# Patient Record
Sex: FEMALE | Race: WHITE | NOT HISPANIC OR LATINO | Employment: UNEMPLOYED | URBAN - METROPOLITAN AREA
[De-identification: names, ages, dates, MRNs, and addresses within clinical notes are randomized per-mention and may not be internally consistent; named-entity substitution may affect disease eponyms.]

---

## 2023-01-18 PROBLEM — N63.10 BREAST MASS, RIGHT: Status: ACTIVE | Noted: 2023-01-18

## 2023-01-18 PROBLEM — M53.3 COCCYXDYNIA: Status: ACTIVE | Noted: 2023-01-18

## 2023-01-18 PROBLEM — E07.9 THYROID DISEASE: Status: ACTIVE | Noted: 2023-01-18

## 2023-01-18 PROBLEM — R25.1 TREMOR: Status: ACTIVE | Noted: 2023-01-18

## 2023-01-18 PROBLEM — M47.816 DEGENERATIVE ARTHRITIS OF LUMBAR SPINE: Status: ACTIVE | Noted: 2023-01-18

## 2023-01-18 PROBLEM — N83.209 OVARIAN CYST: Status: ACTIVE | Noted: 2023-01-18

## 2023-01-18 PROBLEM — F41.9 ANXIETY DISORDER: Status: ACTIVE | Noted: 2023-01-18

## 2023-01-18 PROBLEM — M54.50 LOW BACK PAIN: Status: ACTIVE | Noted: 2023-01-18

## 2023-01-18 PROBLEM — S13.9XXA CERVICAL SPRAIN: Status: ACTIVE | Noted: 2023-01-18

## 2023-01-18 PROBLEM — R42 SPINNING SENSATION: Status: ACTIVE | Noted: 2023-01-18

## 2023-01-18 PROBLEM — E86.0 DEHYDRATION: Status: ACTIVE | Noted: 2023-01-18

## 2023-01-18 PROBLEM — N60.09 BREAST CYST: Status: ACTIVE | Noted: 2023-01-18

## 2023-01-18 PROBLEM — M79.2 NERVE PAIN: Status: ACTIVE | Noted: 2023-01-18

## 2023-01-18 PROBLEM — E53.8 B12 DEFICIENCY: Status: ACTIVE | Noted: 2023-01-18

## 2023-01-18 PROBLEM — R10.2 PAIN IN PELVIS: Status: ACTIVE | Noted: 2023-01-18

## 2023-01-18 PROBLEM — M62.838 MUSCLE SPASM: Status: ACTIVE | Noted: 2023-01-18

## 2023-01-18 PROBLEM — E03.9 HYPOTHYROIDISM: Status: ACTIVE | Noted: 2023-01-18

## 2023-01-18 PROBLEM — F32.A DEPRESSION: Status: ACTIVE | Noted: 2023-01-18

## 2023-01-18 PROBLEM — R20.0 NUMBNESS: Status: ACTIVE | Noted: 2023-01-18

## 2023-01-18 PROBLEM — G89.29 CHRONIC PAIN: Status: ACTIVE | Noted: 2023-01-18

## 2023-01-18 RX ORDER — LEVOTHYROXINE SODIUM 75 UG/1
75 TABLET ORAL DAILY
COMMUNITY
Start: 2022-10-13 | End: 2023-03-17 | Stop reason: SDUPTHER

## 2023-01-18 RX ORDER — DULOXETIN HYDROCHLORIDE 20 MG/1
20 CAPSULE, DELAYED RELEASE ORAL DAILY
COMMUNITY
End: 2023-03-06 | Stop reason: SINTOL

## 2023-03-04 PROBLEM — F33.1 MODERATE EPISODE OF RECURRENT MAJOR DEPRESSIVE DISORDER (MULTI): Status: ACTIVE | Noted: 2023-01-18

## 2023-03-04 PROBLEM — G89.29 CHRONIC PAIN: Status: RESOLVED | Noted: 2023-01-18 | Resolved: 2023-03-04

## 2023-03-04 PROBLEM — E07.9 THYROID DISEASE: Status: RESOLVED | Noted: 2023-01-18 | Resolved: 2023-03-04

## 2023-03-04 PROBLEM — R10.2 PAIN IN PELVIS: Status: RESOLVED | Noted: 2023-01-18 | Resolved: 2023-03-04

## 2023-03-04 PROBLEM — E86.0 DEHYDRATION: Status: RESOLVED | Noted: 2023-01-18 | Resolved: 2023-03-04

## 2023-03-04 PROBLEM — R20.0 NUMBNESS: Status: RESOLVED | Noted: 2023-01-18 | Resolved: 2023-03-04

## 2023-03-04 PROBLEM — M79.2 NERVE PAIN: Status: RESOLVED | Noted: 2023-01-18 | Resolved: 2023-03-04

## 2023-03-04 PROBLEM — R42 SPINNING SENSATION: Status: RESOLVED | Noted: 2023-01-18 | Resolved: 2023-03-04

## 2023-03-04 PROBLEM — M62.838 MUSCLE SPASM: Status: RESOLVED | Noted: 2023-01-18 | Resolved: 2023-03-04

## 2023-03-06 ENCOUNTER — OFFICE VISIT (OUTPATIENT)
Dept: PRIMARY CARE | Facility: CLINIC | Age: 48
End: 2023-03-06
Payer: COMMERCIAL

## 2023-03-06 VITALS
DIASTOLIC BLOOD PRESSURE: 73 MMHG | SYSTOLIC BLOOD PRESSURE: 108 MMHG | OXYGEN SATURATION: 98 % | BODY MASS INDEX: 25.48 KG/M2 | TEMPERATURE: 97.6 F | HEIGHT: 63 IN | HEART RATE: 84 BPM | WEIGHT: 143.8 LBS | RESPIRATION RATE: 18 BRPM

## 2023-03-06 DIAGNOSIS — R41.89 COGNITIVE CHANGE: ICD-10-CM

## 2023-03-06 DIAGNOSIS — Z00.00 ROUTINE HEALTH MAINTENANCE: ICD-10-CM

## 2023-03-06 DIAGNOSIS — F33.2 MAJOR DEPRESSIVE DISORDER, RECURRENT SEVERE WITHOUT PSYCHOTIC FEATURES (MULTI): ICD-10-CM

## 2023-03-06 DIAGNOSIS — G62.89 OTHER POLYNEUROPATHY: ICD-10-CM

## 2023-03-06 DIAGNOSIS — N83.209 CYST OF OVARY, UNSPECIFIED LATERALITY: ICD-10-CM

## 2023-03-06 DIAGNOSIS — R25.1 TREMOR: ICD-10-CM

## 2023-03-06 DIAGNOSIS — F41.1 GENERALIZED ANXIETY DISORDER: ICD-10-CM

## 2023-03-06 DIAGNOSIS — Z00.00 HEALTH MAINTENANCE EXAMINATION: Primary | ICD-10-CM

## 2023-03-06 DIAGNOSIS — S13.9XXA NECK SPRAIN, INITIAL ENCOUNTER: ICD-10-CM

## 2023-03-06 PROBLEM — G62.9 PERIPHERAL NEUROPATHY: Status: ACTIVE | Noted: 2023-03-06

## 2023-03-06 PROCEDURE — 1036F TOBACCO NON-USER: CPT | Performed by: FAMILY MEDICINE

## 2023-03-06 PROCEDURE — 99205 OFFICE O/P NEW HI 60 MIN: CPT | Performed by: FAMILY MEDICINE

## 2023-03-06 RX ORDER — ZIPRASIDONE HYDROCHLORIDE 40 MG/1
40 CAPSULE ORAL DAILY
COMMUNITY
End: 2023-09-25 | Stop reason: SINTOL

## 2023-03-06 NOTE — PROGRESS NOTES
"Subjective   Patient ID: Josselyn Nielsen is a 48 y.o. female who presents for New Patient Visit (Concerns of mental health ).    HPI     Review of Systems    Objective   /73   Pulse 84   Temp 36.4 °C (97.6 °F)   Resp 18   Ht 1.6 m (5' 3\")   Wt 65.2 kg (143 lb 12.8 oz)   SpO2 98%   BMI 25.47 kg/m²     Physical Exam    Assessment/Plan          "
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
## 2023-03-07 ENCOUNTER — TELEPHONE (OUTPATIENT)
Dept: PRIMARY CARE | Facility: CLINIC | Age: 48
End: 2023-03-07
Payer: COMMERCIAL

## 2023-03-09 PROBLEM — M54.50 LOW BACK PAIN: Status: RESOLVED | Noted: 2023-01-18 | Resolved: 2023-03-09

## 2023-03-09 PROBLEM — M53.3 COCCYXDYNIA: Status: RESOLVED | Noted: 2023-01-18 | Resolved: 2023-03-09

## 2023-03-09 PROBLEM — N60.09 BREAST CYST: Status: RESOLVED | Noted: 2023-01-18 | Resolved: 2023-03-09

## 2023-03-09 PROBLEM — F33.2 MAJOR DEPRESSIVE DISORDER, RECURRENT SEVERE WITHOUT PSYCHOTIC FEATURES (MULTI): Status: ACTIVE | Noted: 2023-01-18

## 2023-03-09 ASSESSMENT — ENCOUNTER SYMPTOMS
COLOR CHANGE: 1
NECK PAIN: 1
NECK STIFFNESS: 1
BACK PAIN: 1
NERVOUS/ANXIOUS: 1
TREMORS: 1
AGITATION: 1
FATIGUE: 1
NUMBNESS: 1

## 2023-03-09 NOTE — ASSESSMENT & PLAN NOTE
She has tingling and cool extremities but normal exam.  I will r/o systemic causes with blood work.   She is wanting to see a neurologist.  Also for her tremor and neck pain,

## 2023-03-17 ENCOUNTER — TELEPHONE (OUTPATIENT)
Dept: PRIMARY CARE | Facility: CLINIC | Age: 48
End: 2023-03-17
Payer: COMMERCIAL

## 2023-03-17 DIAGNOSIS — E03.9 HYPOTHYROIDISM, UNSPECIFIED TYPE: Primary | ICD-10-CM

## 2023-03-17 RX ORDER — LEVOTHYROXINE SODIUM 75 UG/1
75 TABLET ORAL DAILY
Qty: 90 TABLET | Refills: 1 | Status: SHIPPED | OUTPATIENT
Start: 2023-03-17 | End: 2023-09-21

## 2023-03-20 ENCOUNTER — OFFICE VISIT (OUTPATIENT)
Dept: PRIMARY CARE | Facility: CLINIC | Age: 48
End: 2023-03-20
Payer: COMMERCIAL

## 2023-03-20 DIAGNOSIS — M54.2 CHRONIC NECK PAIN: ICD-10-CM

## 2023-03-20 DIAGNOSIS — G60.9 IDIOPATHIC PERIPHERAL NEUROPATHY: ICD-10-CM

## 2023-03-20 DIAGNOSIS — H53.143 PHOTOPHOBIA OF BOTH EYES: Primary | ICD-10-CM

## 2023-03-20 DIAGNOSIS — G89.29 CHRONIC NECK PAIN: ICD-10-CM

## 2023-03-20 DIAGNOSIS — R42 VERTIGO: ICD-10-CM

## 2023-03-20 DIAGNOSIS — F33.2 MAJOR DEPRESSIVE DISORDER, RECURRENT SEVERE WITHOUT PSYCHOTIC FEATURES (MULTI): Primary | ICD-10-CM

## 2023-03-20 PROCEDURE — 1036F TOBACCO NON-USER: CPT | Performed by: PHYSICIAN ASSISTANT

## 2023-03-20 PROCEDURE — 99214 OFFICE O/P EST MOD 30 MIN: CPT | Performed by: PHYSICIAN ASSISTANT

## 2023-03-20 RX ORDER — ZIPRASIDONE HYDROCHLORIDE 20 MG/1
20 CAPSULE ORAL
COMMUNITY
End: 2023-09-25 | Stop reason: SINTOL

## 2023-03-20 RX ORDER — ZIPRASIDONE HYDROCHLORIDE 20 MG/1
20 CAPSULE ORAL
Qty: 60 CAPSULE | Refills: 2 | Status: SHIPPED | OUTPATIENT
Start: 2023-03-20 | End: 2023-09-25 | Stop reason: SDUPTHER

## 2023-03-20 RX ORDER — MECLIZINE HYDROCHLORIDE 25 MG/1
25 TABLET ORAL 3 TIMES DAILY PRN
Qty: 30 TABLET | Refills: 1 | Status: SHIPPED | OUTPATIENT
Start: 2023-03-20 | End: 2023-04-19

## 2023-03-20 ASSESSMENT — ENCOUNTER SYMPTOMS
CHILLS: 0
FEVER: 0
EYE REDNESS: 1

## 2023-03-20 NOTE — PROGRESS NOTES
Subjective   Patient ID: Josselyn Nielsen is a 48 y.o. female who presents for Follow-up (Pt followed up with [facility] and they have her on Geodon 40mg and 20mg to equal the 60 mg and seems to be working great. Pt wants ears checked due to feeling like she is rocking. ).    HPI     Psych:  - Doing great!   - She is finally starting to write abouteverhyitng and that's helping her   - Finaly the NP psych she's with now Abena Aguilera at [facility] - she took one look at her after taking the 1 month of Cymbalta and decided she is in the middle of a mixed episode - psych truly believes she's going between a state of a 4yo and an adult - pt agrees with her.   - Now on Geodon - it is working! It's the first medication that is calming her weihgout takign away her gifts - can still wrist, can still pain and feel.   - The month of Cymbalta still causing vestibular isseus, feels liek she's moving, feels like she's int eh chair and gently moving in water like she's bobbing up and down and moving side to side. It makes her feel nauseous, ears still rining. The last time this happened to her it lasted 6 months and she felt suicidal because it was so bad.   - She's trying to look at it as a setup because it's showing her there's something not right in her neck and ears and she's known it ofr a long time - will walk liek a drunken sailer, her balance is not right. Wants to see a neurologist to see whats going on. Her cousin who's a nurse recommended referal for neurootolagist       Review of Systems   Constitutional:  Negative for chills and fever.   Eyes:  Positive for redness.   Cardiovascular:  Negative for chest pain.       Objective   /70   Pulse 71   Temp 36.6 °C (97.8 °F)   Resp 16   Wt 66.2 kg (146 lb)   SpO2 99%   BMI 25.86 kg/m²     Physical Exam    Assessment/Plan   Problem List Items Addressed This Visit          Nervous    Peripheral neuropathy     - Often off balance and feels like she's drunk when walking  - Pt  requests Neurology referral - order placed              Other    Vertigo     - Referred for vestibular therapy   - Can try meclizine prn sxs   - Given handout of modified Epley maneuver to do at home            Relevant Medications    meclizine (Antivert) 25 mg tablet    Other Relevant Orders    Referral to Physical Therapy     Other Visit Diagnoses       Photophobia of both eyes    -  Primary    Relevant Orders    ARGENIS with Reflex to GIORGI    Chronic neck pain        Relevant Orders    Referral to Neurology

## 2023-03-20 NOTE — PROGRESS NOTES
Patent reports needs a 20mg capsule to go with her 40mg capsule she has a refill or prescription however she will not be starting that tomorrow patient reports she is in need of the medication for sleep and her mental health will send prescription to pharmacy

## 2023-03-21 VITALS
OXYGEN SATURATION: 99 % | RESPIRATION RATE: 16 BRPM | DIASTOLIC BLOOD PRESSURE: 70 MMHG | TEMPERATURE: 97.8 F | BODY MASS INDEX: 25.86 KG/M2 | SYSTOLIC BLOOD PRESSURE: 112 MMHG | WEIGHT: 146 LBS | HEART RATE: 71 BPM

## 2023-03-23 PROBLEM — R42 VERTIGO: Status: ACTIVE | Noted: 2023-03-23

## 2023-03-23 NOTE — ASSESSMENT & PLAN NOTE
- Referred for vestibular therapy   - Can try meclizine prn sxs   - Given handout of modified Epley maneuver to do at home

## 2023-03-23 NOTE — ASSESSMENT & PLAN NOTE
- Often off balance and feels like she's drunk when walking  - Pt requests Neurology referral - order placed

## 2023-04-18 ENCOUNTER — INITIAL CONSULT (OUTPATIENT)
Dept: PAIN MANAGEMENT | Age: 48
End: 2023-04-18
Payer: COMMERCIAL

## 2023-04-18 VITALS
SYSTOLIC BLOOD PRESSURE: 112 MMHG | TEMPERATURE: 97.5 F | HEIGHT: 63 IN | DIASTOLIC BLOOD PRESSURE: 62 MMHG | BODY MASS INDEX: 25.34 KG/M2 | WEIGHT: 143 LBS

## 2023-04-18 DIAGNOSIS — F32.A DEPRESSION, UNSPECIFIED DEPRESSION TYPE: ICD-10-CM

## 2023-04-18 DIAGNOSIS — R10.31 GROIN PAIN, CHRONIC, RIGHT: Primary | ICD-10-CM

## 2023-04-18 DIAGNOSIS — G89.29 GROIN PAIN, CHRONIC, RIGHT: Primary | ICD-10-CM

## 2023-04-18 DIAGNOSIS — F41.9 ANXIETY: ICD-10-CM

## 2023-04-18 PROCEDURE — 4004F PT TOBACCO SCREEN RCVD TLK: CPT | Performed by: PAIN MEDICINE

## 2023-04-18 PROCEDURE — G8419 CALC BMI OUT NRM PARAM NOF/U: HCPCS | Performed by: PAIN MEDICINE

## 2023-04-18 PROCEDURE — 99214 OFFICE O/P EST MOD 30 MIN: CPT | Performed by: PAIN MEDICINE

## 2023-04-18 PROCEDURE — G8427 DOCREV CUR MEDS BY ELIG CLIN: HCPCS | Performed by: PAIN MEDICINE

## 2023-04-18 RX ORDER — FLUOXETINE HYDROCHLORIDE 20 MG/1
CAPSULE ORAL DAILY
COMMUNITY
Start: 2023-04-06

## 2023-04-18 RX ORDER — ZIPRASIDONE HYDROCHLORIDE 60 MG/1
CAPSULE ORAL
COMMUNITY
Start: 2023-04-15

## 2023-04-18 RX ORDER — LEVOTHYROXINE SODIUM 0.07 MG/1
TABLET ORAL
COMMUNITY
Start: 2023-03-27

## 2023-04-18 ASSESSMENT — ENCOUNTER SYMPTOMS
DIARRHEA: 0
BACK PAIN: 1
CONSTIPATION: 0
NAUSEA: 0
SHORTNESS OF BREATH: 0

## 2023-04-18 NOTE — PROGRESS NOTES
(36.4 °C)   Ht 5' 3\" (1.6 m)   Wt 143 lb (64.9 kg)   BMI 25.33 kg/m² Pain Score:   6      Physical Exam  General Appearance: NAD and Conversant. Eyes: EOM intact. HENT: Atraumatic. Neck: Neck is supple and Trachea midline. Lymph: No supraclavicular lymphadenopathy. Lungs: Normal respiratory effort and No significant respiratory distress. Cardiovascular: Capillary refill is less than 2 seconds. Skin: Visualized skin is intact. Psych: Mood and affect within normal limits, Insight and judgement within normal limits, and Alert and oriented. Inspection of the spine reveals no gross abnormalities in terms of curvature. Muscle Tone is within normal limits in all four extremities. Strength: Functional strength noted throughout. Neurologic:  Coordination is within normal limits. Gait is within normal limits. No difficulty with heel walking, toe walking and tandem walking. HIP: There is no hip irritability with internal rotation of either hip. SPINE: There is TTP over the bilateral lumbar paraspinal musculature. Positive facet loading noted bilaterally. SLR is negative bilaterally. DATA REVIEW:   XRAY CERVICAL/THORACIC/LUMBAR SPINE 1/6/2021:    RESULT:   Cervical spine: Trace retrolisthesis of C5 relative to C6 in the presence   of mild to moderate disc height loss and small osteophyte formation. Mild to moderate C6-7 disc height loss with associated small osteophyte   formation. Prevertebral soft tissues are within normal limits. Vertebral body heights are maintained. No osseous erosion. Thoracic spine: Thoracic spine is normally aligned. Minimal disc height   loss and osteophyte formation in the mid to upper thoracic spine. Vertebral body heights are maintained. No osseous erosion. Cervicothoracic junction is located. Lumbar spine: Minimal dextroconvex scoliosis, perhaps positional.  Mild   to moderate L5-S1 disc height loss.   Vertebral body heights are

## 2023-06-06 ENCOUNTER — APPOINTMENT (OUTPATIENT)
Dept: PRIMARY CARE | Facility: CLINIC | Age: 48
End: 2023-06-06
Payer: COMMERCIAL

## 2023-07-12 ENCOUNTER — LAB (OUTPATIENT)
Dept: LAB | Facility: LAB | Age: 48
End: 2023-07-12
Payer: COMMERCIAL

## 2023-07-12 DIAGNOSIS — H53.143 PHOTOPHOBIA OF BOTH EYES: ICD-10-CM

## 2023-07-12 DIAGNOSIS — N83.209 CYST OF OVARY, UNSPECIFIED LATERALITY: ICD-10-CM

## 2023-07-12 DIAGNOSIS — Z00.00 ROUTINE HEALTH MAINTENANCE: ICD-10-CM

## 2023-07-12 DIAGNOSIS — Z00.00 HEALTH MAINTENANCE EXAMINATION: ICD-10-CM

## 2023-07-12 LAB
ALANINE AMINOTRANSFERASE (SGPT) (U/L) IN SER/PLAS: 33 U/L (ref 7–45)
ALBUMIN (G/DL) IN SER/PLAS: 4.2 G/DL (ref 3.4–5)
ALKALINE PHOSPHATASE (U/L) IN SER/PLAS: 63 U/L (ref 33–110)
ANION GAP IN SER/PLAS: 11 MMOL/L (ref 10–20)
ASPARTATE AMINOTRANSFERASE (SGOT) (U/L) IN SER/PLAS: 24 U/L (ref 9–39)
BILIRUBIN TOTAL (MG/DL) IN SER/PLAS: 0.4 MG/DL (ref 0–1.2)
CALCIUM (MG/DL) IN SER/PLAS: 9.1 MG/DL (ref 8.6–10.3)
CARBON DIOXIDE, TOTAL (MMOL/L) IN SER/PLAS: 29 MMOL/L (ref 21–32)
CHLORIDE (MMOL/L) IN SER/PLAS: 101 MMOL/L (ref 98–107)
CREATININE (MG/DL) IN SER/PLAS: 0.69 MG/DL (ref 0.5–1.05)
ERYTHROCYTE DISTRIBUTION WIDTH (RATIO) BY AUTOMATED COUNT: 13.1 % (ref 11.5–14.5)
ERYTHROCYTE MEAN CORPUSCULAR HEMOGLOBIN CONCENTRATION (G/DL) BY AUTOMATED: 33.1 G/DL (ref 32–36)
ERYTHROCYTE MEAN CORPUSCULAR VOLUME (FL) BY AUTOMATED COUNT: 94 FL (ref 80–100)
ERYTHROCYTES (10*6/UL) IN BLOOD BY AUTOMATED COUNT: 4.13 X10E12/L (ref 4–5.2)
GFR FEMALE: >90 ML/MIN/1.73M2
GLUCOSE (MG/DL) IN SER/PLAS: 91 MG/DL (ref 74–99)
HEMATOCRIT (%) IN BLOOD BY AUTOMATED COUNT: 39 % (ref 36–46)
HEMOGLOBIN (G/DL) IN BLOOD: 12.9 G/DL (ref 12–16)
LEUKOCYTES (10*3/UL) IN BLOOD BY AUTOMATED COUNT: 7.9 X10E9/L (ref 4.4–11.3)
PLATELETS (10*3/UL) IN BLOOD AUTOMATED COUNT: 274 X10E9/L (ref 150–450)
POTASSIUM (MMOL/L) IN SER/PLAS: 4.2 MMOL/L (ref 3.5–5.3)
PROTEIN TOTAL: 6.9 G/DL (ref 6.4–8.2)
SODIUM (MMOL/L) IN SER/PLAS: 137 MMOL/L (ref 136–145)
THYROTROPIN (MIU/L) IN SER/PLAS BY DETECTION LIMIT <= 0.05 MIU/L: 2.57 MIU/L (ref 0.44–3.98)
UREA NITROGEN (MG/DL) IN SER/PLAS: 7 MG/DL (ref 6–23)

## 2023-07-12 PROCEDURE — 36415 COLL VENOUS BLD VENIPUNCTURE: CPT

## 2023-07-12 PROCEDURE — 86038 ANTINUCLEAR ANTIBODIES: CPT

## 2023-07-12 PROCEDURE — 83001 ASSAY OF GONADOTROPIN (FSH): CPT

## 2023-07-12 PROCEDURE — 80053 COMPREHEN METABOLIC PANEL: CPT

## 2023-07-12 PROCEDURE — 82306 VITAMIN D 25 HYDROXY: CPT

## 2023-07-12 PROCEDURE — 85027 COMPLETE CBC AUTOMATED: CPT

## 2023-07-12 PROCEDURE — 82607 VITAMIN B-12: CPT

## 2023-07-12 PROCEDURE — 83002 ASSAY OF GONADOTROPIN (LH): CPT

## 2023-07-12 PROCEDURE — 82670 ASSAY OF TOTAL ESTRADIOL: CPT

## 2023-07-12 PROCEDURE — 84443 ASSAY THYROID STIM HORMONE: CPT

## 2023-07-13 LAB
ANTI-NUCLEAR ANTIBODY (ANA): NEGATIVE
CALCIDIOL (25 OH VITAMIN D3) (NG/ML) IN SER/PLAS: 26 NG/ML
COBALAMIN (VITAMIN B12) (PG/ML) IN SER/PLAS: 218 PG/ML (ref 211–911)
ESTRADIOL (PG/ML) IN SER/PLAS: <19 PG/ML
FOLLITROPIN (IU/L) IN SER/PLAS: 30.2 IU/L
LUTEINIZING HORMONE (IU/ML) IN SER/PLAS: 6.8 IU/L

## 2023-07-14 ENCOUNTER — TELEPHONE (OUTPATIENT)
Dept: PRIMARY CARE | Facility: CLINIC | Age: 48
End: 2023-07-14
Payer: COMMERCIAL

## 2023-07-14 DIAGNOSIS — R79.89 LOW VITAMIN D LEVEL: Primary | ICD-10-CM

## 2023-07-14 RX ORDER — ERGOCALCIFEROL 1.25 MG/1
1 CAPSULE ORAL
COMMUNITY
End: 2023-07-14 | Stop reason: SDUPTHER

## 2023-07-14 NOTE — TELEPHONE ENCOUNTER
patient contacted the office to get script for Vit D sent to [facility] I dont see that on her med list ....

## 2023-07-17 RX ORDER — ERGOCALCIFEROL 1.25 MG/1
1 CAPSULE ORAL
Qty: 30 CAPSULE | Refills: 0 | Status: SHIPPED | OUTPATIENT
Start: 2023-07-17 | End: 2023-10-16 | Stop reason: ALTCHOICE

## 2023-09-20 PROBLEM — F54 PSYCHOLOGICAL FACTORS AFFECTING MEDICAL CONDITION: Status: ACTIVE | Noted: 2023-09-20

## 2023-09-20 PROBLEM — F43.23 ADJUSTMENT DISORDER WITH MIXED ANXIETY AND DEPRESSED MOOD: Status: ACTIVE | Noted: 2021-04-28

## 2023-09-20 PROBLEM — R20.9 SKIN SENSATION DISTURBANCE: Status: ACTIVE | Noted: 2020-04-07

## 2023-09-20 PROBLEM — E44.0 MODERATE PROTEIN-CALORIE MALNUTRITION (MULTI): Status: ACTIVE | Noted: 2020-03-20

## 2023-09-20 PROBLEM — F31.62 BIPOLAR DISORDER, CURRENT EPISODE MIXED, MODERATE (MULTI): Status: ACTIVE | Noted: 2021-04-16

## 2023-09-20 PROBLEM — G47.00 INSOMNIA: Status: ACTIVE | Noted: 2020-03-20

## 2023-09-20 PROBLEM — F32.A DEPRESSION: Status: ACTIVE | Noted: 2023-09-20

## 2023-09-20 PROBLEM — F32.9 MDD (MAJOR DEPRESSIVE DISORDER): Status: ACTIVE | Noted: 2021-12-08

## 2023-09-22 LAB
COBALAMIN (VITAMIN B12) (PG/ML) IN SER/PLAS: 305 PG/ML (ref 211–911)
ERYTHROCYTE DISTRIBUTION WIDTH (RATIO) BY AUTOMATED COUNT: 12.9 % (ref 11.5–14.5)
ERYTHROCYTE MEAN CORPUSCULAR HEMOGLOBIN CONCENTRATION (G/DL) BY AUTOMATED: 33.7 G/DL (ref 32–36)
ERYTHROCYTE MEAN CORPUSCULAR VOLUME (FL) BY AUTOMATED COUNT: 93 FL (ref 80–100)
ERYTHROCYTES (10*6/UL) IN BLOOD BY AUTOMATED COUNT: 4.1 X10E12/L (ref 4–5.2)
HEMATOCRIT (%) IN BLOOD BY AUTOMATED COUNT: 38.3 % (ref 36–46)
HEMOGLOBIN (G/DL) IN BLOOD: 12.9 G/DL (ref 12–16)
IRON (UG/DL) IN SER/PLAS: 91 UG/DL (ref 35–150)
IRON BINDING CAPACITY (UG/DL) IN SER/PLAS: 324 UG/DL (ref 240–445)
IRON SATURATION (%) IN SER/PLAS: 28 % (ref 25–45)
LEUKOCYTES (10*3/UL) IN BLOOD BY AUTOMATED COUNT: 10.5 X10E9/L (ref 4.4–11.3)
PLATELETS (10*3/UL) IN BLOOD AUTOMATED COUNT: 281 X10E9/L (ref 150–450)
THYROTROPIN (MIU/L) IN SER/PLAS BY DETECTION LIMIT <= 0.05 MIU/L: 0.82 MIU/L (ref 0.44–3.98)

## 2023-09-23 LAB — FERRITIN (UG/LL) IN SER/PLAS: 60 UG/L (ref 8–150)

## 2023-09-25 ENCOUNTER — OFFICE VISIT (OUTPATIENT)
Dept: PRIMARY CARE | Facility: CLINIC | Age: 48
End: 2023-09-25
Payer: COMMERCIAL

## 2023-09-25 VITALS
OXYGEN SATURATION: 96 % | SYSTOLIC BLOOD PRESSURE: 112 MMHG | WEIGHT: 146 LBS | HEART RATE: 80 BPM | DIASTOLIC BLOOD PRESSURE: 70 MMHG | BODY MASS INDEX: 24.92 KG/M2 | TEMPERATURE: 98 F | RESPIRATION RATE: 18 BRPM | HEIGHT: 64 IN

## 2023-09-25 DIAGNOSIS — N95.1 PERIMENOPAUSE: ICD-10-CM

## 2023-09-25 DIAGNOSIS — R63.5 WEIGHT GAIN: ICD-10-CM

## 2023-09-25 DIAGNOSIS — E03.9 ACQUIRED HYPOTHYROIDISM: Primary | ICD-10-CM

## 2023-09-25 DIAGNOSIS — R23.2 HOT FLASHES: ICD-10-CM

## 2023-09-25 DIAGNOSIS — R21 RASH: ICD-10-CM

## 2023-09-25 PROCEDURE — 1036F TOBACCO NON-USER: CPT | Performed by: PHYSICIAN ASSISTANT

## 2023-09-25 PROCEDURE — 99213 OFFICE O/P EST LOW 20 MIN: CPT | Performed by: PHYSICIAN ASSISTANT

## 2023-09-25 RX ORDER — SERTRALINE HYDROCHLORIDE 50 MG/1
75 TABLET, FILM COATED ORAL DAILY
COMMUNITY
Start: 2023-06-13

## 2023-09-25 RX ORDER — SERTRALINE HYDROCHLORIDE 100 MG/1
100 TABLET, FILM COATED ORAL DAILY
COMMUNITY
Start: 2023-08-29

## 2023-09-25 RX ORDER — ARIPIPRAZOLE 2 MG/1
1 TABLET ORAL DAILY
COMMUNITY
End: 2024-03-06 | Stop reason: ALTCHOICE

## 2023-09-25 ASSESSMENT — ENCOUNTER SYMPTOMS
FATIGUE: 1
MYALGIAS: 1
UNEXPECTED WEIGHT CHANGE: 1
DIZZINESS: 1

## 2023-09-25 NOTE — PROGRESS NOTES
"Subjective   Patient ID: Josselyn Nielsen is a 48 y.o. female who presents for Follow-up (Pt here today for a follow up and discuss lab results. Gained 8-10 pounds in past 3 weeks so wanting to see if it was her TSH or premenopausal. Pt has discoloration on her neck but hasn't ever seen Derm, so would like ref. ).    HPI     Hypothyroidism:  - Currently on levothyroxine 75 mcg   - Thyroid labs are normal - 9/22/23 TSH 0.82    Bipolar/ depression:  - Followed by psychiatrist  - Doing well on Abilify and zoloft 100 mg   - back on yoga again which is huge change   - Still gets equilibrium issue - feels like walking on trampoline   - Gained 8-10lbs in 3 weeks - it happened so fast so was worried about her thyroid     Perimenopausal  - Only had one period in 3 months  - Not sweating will just get hot - yoga helps with this     Review of Systems   Constitutional:  Positive for fatigue and unexpected weight change.   Musculoskeletal:  Positive for myalgias.   Neurological:  Positive for dizziness.       Objective   /70   Pulse 80   Temp 36.7 °C (98 °F)   Resp 18   Ht 1.613 m (5' 3.5\")   Wt 66.2 kg (146 lb)   SpO2 96%   BMI 25.46 kg/m²     Physical Exam  Constitutional:       Appearance: Normal appearance.   Cardiovascular:      Rate and Rhythm: Normal rate and regular rhythm.      Pulses: Normal pulses.      Heart sounds: Normal heart sounds. No murmur heard.  Pulmonary:      Effort: Pulmonary effort is normal.      Breath sounds: Normal breath sounds.   Neurological:      Mental Status: She is alert.   Psychiatric:         Mood and Affect: Mood and affect normal.         Assessment/Plan   Problem List Items Addressed This Visit       Hypothyroidism - Primary     Other Visit Diagnoses       Rash        Relevant Orders    Referral to Dermatology    Weight gain        Perimenopause        Relevant Medications    estradiol-norethindrone acet (Combipatch) 0.05-0.14 mg/24 hr    Hot flashes        Relevant Medications "    estradiol-norethindrone acet (Combipatch) 0.05-0.14 mg/24 hr          Educated about the Combi patch   Encouraged she follow up if any side effects or concerns   All questions were answered and the pt agreed with the plan

## 2023-10-16 ENCOUNTER — OFFICE VISIT (OUTPATIENT)
Dept: PRIMARY CARE | Facility: CLINIC | Age: 48
End: 2023-10-16
Payer: COMMERCIAL

## 2023-10-16 VITALS
DIASTOLIC BLOOD PRESSURE: 62 MMHG | OXYGEN SATURATION: 97 % | SYSTOLIC BLOOD PRESSURE: 104 MMHG | HEART RATE: 65 BPM | RESPIRATION RATE: 16 BRPM | BODY MASS INDEX: 26.22 KG/M2 | HEIGHT: 63 IN | TEMPERATURE: 98.3 F | WEIGHT: 148 LBS

## 2023-10-16 DIAGNOSIS — E03.9 HYPOTHYROIDISM, UNSPECIFIED TYPE: Primary | ICD-10-CM

## 2023-10-16 DIAGNOSIS — E55.9 HYPOVITAMINOSIS D: ICD-10-CM

## 2023-10-16 DIAGNOSIS — Z00.00 HEALTHCARE MAINTENANCE: ICD-10-CM

## 2023-10-16 DIAGNOSIS — Z12.11 SCREENING FOR MALIGNANT NEOPLASM OF COLON: ICD-10-CM

## 2023-10-16 DIAGNOSIS — Z80.3 FAMILY HISTORY OF BREAST CANCER: ICD-10-CM

## 2023-10-16 PROBLEM — F32.A DEPRESSION: Status: RESOLVED | Noted: 2023-09-20 | Resolved: 2023-10-16

## 2023-10-16 PROBLEM — F43.10 PTSD (POST-TRAUMATIC STRESS DISORDER): Status: ACTIVE | Noted: 2023-01-18

## 2023-10-16 PROBLEM — F33.2 MAJOR DEPRESSIVE DISORDER, RECURRENT SEVERE WITHOUT PSYCHOTIC FEATURES (MULTI): Status: RESOLVED | Noted: 2023-01-18 | Resolved: 2023-10-16

## 2023-10-16 PROCEDURE — 1036F TOBACCO NON-USER: CPT | Performed by: FAMILY MEDICINE

## 2023-10-16 PROCEDURE — 99214 OFFICE O/P EST MOD 30 MIN: CPT | Performed by: FAMILY MEDICINE

## 2023-10-16 NOTE — PATIENT INSTRUCTIONS
Welcome to our practice!    Today we followed up on your Thyroid medication - we will check your labs and adjust the medication accordingly.  Our goal will be to have a TSH between 2 and 3.  We will closely monitor your symptoms to determine the optimal dosing.     You are past due for some screening things such as a mammogram and colonoscopy which we discussed the importance of due to your family history.  I have also referred you to a breast surgeon to discuss any additional screening due to your history.  And even though your cologuard was negative it was done before your mom was diagnosed with colon cancer so I have advised that you not obtain a colonoscopy.     Please make an appointment to follow up for your Annual Physical when you turn 49 in February Pleae follow up with the nurse midwife for your paps

## 2023-10-16 NOTE — PROGRESS NOTES
"Subjective   Patient ID: Josselyn Nielsen is a 48 y.o. female who presents for Thyroid Problem ([facility] ).    She has underactive thyroid.  She has been gaining weight.  She stopped having a period about 6 months ago    She is on zoloft 150 mg daily and is trying to reduce to 125mg daily. She is working with a psychologist and psychiatrist    Thyroid Problem         Review of Systems    Objective   /62   Pulse 65   Temp 36.8 °C (98.3 °F)   Resp 16   Ht 1.6 m (5' 3\")   Wt 67.1 kg (148 lb)   SpO2 97%   BMI 26.22 kg/m²     Physical Exam  Constitutional:       Appearance: Normal appearance.   HENT:      Head: Normocephalic and atraumatic.   Neck:      Thyroid: No thyroid mass, thyromegaly or thyroid tenderness.   Cardiovascular:      Rate and Rhythm: Normal rate and regular rhythm.   Pulmonary:      Effort: Pulmonary effort is normal.      Breath sounds: Normal breath sounds.   Musculoskeletal:      Cervical back: Normal range of motion and neck supple.   Skin:     General: Skin is warm and dry.   Neurological:      Mental Status: She is alert.         Assessment/Plan   Diagnoses and all orders for this visit:  Hypothyroidism, unspecified type  -     TSH; Future  -     T4, free; Future  Family history of breast cancer  -     Referral to Breast Surgery; Future  -     BI mammo bilateral screening tomosynthesis; Future  Screening for malignant neoplasm of colon  -     Colonoscopy Screening; Future  Hypovitaminosis D  -     Vitamin D 25 hydroxy; Future  Healthcare maintenance  -     Lipid Panel; Future  -     Comprehensive Metabolic Panel; Future  -     CBC; Future         "

## 2023-11-08 ENCOUNTER — HOSPITAL ENCOUNTER (OUTPATIENT)
Dept: RADIOLOGY | Facility: HOSPITAL | Age: 48
Discharge: HOME | End: 2023-11-08
Payer: COMMERCIAL

## 2023-11-08 VITALS — BODY MASS INDEX: 25.86 KG/M2 | WEIGHT: 146 LBS

## 2023-11-08 DIAGNOSIS — Z80.3 FAMILY HISTORY OF BREAST CANCER: ICD-10-CM

## 2023-11-08 PROCEDURE — 77067 SCR MAMMO BI INCL CAD: CPT

## 2023-11-08 PROCEDURE — 77063 BREAST TOMOSYNTHESIS BI: CPT | Performed by: RADIOLOGY

## 2023-11-08 PROCEDURE — 77067 SCR MAMMO BI INCL CAD: CPT | Performed by: RADIOLOGY

## 2023-11-29 ENCOUNTER — OFFICE VISIT (OUTPATIENT)
Dept: PRIMARY CARE | Facility: CLINIC | Age: 48
End: 2023-11-29
Payer: COMMERCIAL

## 2023-11-29 VITALS
RESPIRATION RATE: 18 BRPM | HEIGHT: 63 IN | TEMPERATURE: 98.1 F | BODY MASS INDEX: 26.58 KG/M2 | WEIGHT: 150 LBS | SYSTOLIC BLOOD PRESSURE: 110 MMHG | DIASTOLIC BLOOD PRESSURE: 74 MMHG | HEART RATE: 81 BPM | OXYGEN SATURATION: 98 %

## 2023-11-29 DIAGNOSIS — J40 BRONCHITIS: ICD-10-CM

## 2023-11-29 DIAGNOSIS — R05.9 COUGH, UNSPECIFIED TYPE: Primary | ICD-10-CM

## 2023-11-29 PROCEDURE — 87637 SARSCOV2&INF A&B&RSV AMP PRB: CPT

## 2023-11-29 PROCEDURE — 99213 OFFICE O/P EST LOW 20 MIN: CPT | Performed by: FAMILY MEDICINE

## 2023-11-29 PROCEDURE — 1036F TOBACCO NON-USER: CPT | Performed by: FAMILY MEDICINE

## 2023-11-29 RX ORDER — AZITHROMYCIN 250 MG/1
TABLET, FILM COATED ORAL
Qty: 6 TABLET | Refills: 0 | Status: SHIPPED | OUTPATIENT
Start: 2023-11-29 | End: 2023-12-04

## 2023-11-29 RX ORDER — BENZONATATE 100 MG/1
100 CAPSULE ORAL 3 TIMES DAILY PRN
Qty: 42 CAPSULE | Refills: 0 | Status: SHIPPED | OUTPATIENT
Start: 2023-11-29 | End: 2023-12-29

## 2023-11-29 RX ORDER — ALPRAZOLAM 0.5 MG/1
TABLET ORAL
COMMUNITY
Start: 2023-11-17

## 2023-11-29 NOTE — PATIENT INSTRUCTIONS
Today we treated you for a viral respiratory infection.    I have trested you again for covid/flu and RSV.      I have treated you for bronchitis and prescribed an antibiotic as well as tessalon for your cough    Recommend increase fluids and rest.   Follow up if no improvement or if symptoms worsen.   Try otc medication such as mucinex, claritin or pseudofed for symptoms.  Do not use for longer than 5 days and if symptoms persist please call the office or Return to Clinic to be seen.

## 2023-11-29 NOTE — PROGRESS NOTES
"Subjective   Patient ID: Josselyn Nielsen is a 48 y.o. female who presents for URI (Symptoms started 2 weeks ago. Coughing with sputum, body aches, headaches, no vomitting, sore throat that comes and goes. Negative COVID test 11/28/2023. Symptoms are getting worse. ).    Low level cough started 2 weeks ago and now it's getting worse. Headache, body aches, a sore throat coming and sore throat. She feels like a truck hit her.    She feels like it's harder to take a deep breath.  She denies wheezing.      URI          Review of Systems    Objective   /74   Pulse 81   Temp 36.7 °C (98.1 °F)   Resp 18   Ht 1.6 m (5' 3\")   Wt 68 kg (150 lb)   SpO2 98%   BMI 26.57 kg/m²     Physical Exam  Constitutional:       Appearance: Normal appearance.   HENT:      Head: Normocephalic and atraumatic.      Right Ear: Tympanic membrane normal.      Left Ear: Tympanic membrane normal.      Nose: Congestion and rhinorrhea present.      Mouth/Throat:      Mouth: Mucous membranes are moist.   Cardiovascular:      Rate and Rhythm: Normal rate and regular rhythm.      Pulses: Normal pulses.   Pulmonary:      Effort: Pulmonary effort is normal. No respiratory distress.      Breath sounds: Normal breath sounds. No stridor. No wheezing, rhonchi or rales.   Musculoskeletal:      Cervical back: Normal range of motion and neck supple.   Skin:     General: Skin is warm and dry.   Neurological:      Mental Status: She is alert.   Psychiatric:         Mood and Affect: Mood normal.         Assessment/Plan   Diagnoses and all orders for this visit:  Cough, unspecified type  -     RSV PCR; Future  -     Sars-CoV-2 and Influenza A/B PCR; Future  -     benzonatate (Tessalon) 100 mg capsule; Take 1 capsule (100 mg) by mouth 3 times a day as needed for cough. Do not crush or chew.  Bronchitis  -     azithromycin (Zithromax) 250 mg tablet; Take 2 tablets (500 mg) by mouth once daily for 1 day, THEN 1 tablet (250 mg) once daily for 4 days. Take 2 tabs " (500 mg) by mouth today, than 1 daily for 4 days..

## 2023-11-30 LAB
FLUAV RNA RESP QL NAA+PROBE: NOT DETECTED
FLUBV RNA RESP QL NAA+PROBE: NOT DETECTED
RSV RNA RESP QL NAA+PROBE: DETECTED
SARS-COV-2 ORF1AB RESP QL NAA+PROBE: NOT DETECTED

## 2023-12-03 ENCOUNTER — PATIENT MESSAGE (OUTPATIENT)
Dept: PRIMARY CARE | Facility: CLINIC | Age: 48
End: 2023-12-03
Payer: COMMERCIAL

## 2023-12-03 DIAGNOSIS — R42 DIZZINESS: ICD-10-CM

## 2023-12-03 DIAGNOSIS — J21.0 RSV (ACUTE BRONCHIOLITIS DUE TO RESPIRATORY SYNCYTIAL VIRUS): Primary | ICD-10-CM

## 2023-12-04 ENCOUNTER — LAB (OUTPATIENT)
Dept: LAB | Facility: LAB | Age: 48
End: 2023-12-04
Payer: COMMERCIAL

## 2023-12-04 DIAGNOSIS — Z00.00 HEALTHCARE MAINTENANCE: ICD-10-CM

## 2023-12-04 DIAGNOSIS — E03.9 HYPOTHYROIDISM, UNSPECIFIED TYPE: ICD-10-CM

## 2023-12-04 DIAGNOSIS — E55.9 HYPOVITAMINOSIS D: ICD-10-CM

## 2023-12-04 LAB
25(OH)D3 SERPL-MCNC: 36 NG/ML (ref 30–100)
ALBUMIN SERPL BCP-MCNC: 4.1 G/DL (ref 3.4–5)
ALP SERPL-CCNC: 51 U/L (ref 33–110)
ALT SERPL W P-5'-P-CCNC: 55 U/L (ref 7–45)
ANION GAP SERPL CALC-SCNC: 8 MMOL/L (ref 10–20)
AST SERPL W P-5'-P-CCNC: 41 U/L (ref 9–39)
BILIRUB SERPL-MCNC: 0.3 MG/DL (ref 0–1.2)
BUN SERPL-MCNC: 7 MG/DL (ref 6–23)
CALCIUM SERPL-MCNC: 9 MG/DL (ref 8.6–10.3)
CHLORIDE SERPL-SCNC: 103 MMOL/L (ref 98–107)
CHOLEST SERPL-MCNC: 245 MG/DL (ref 0–199)
CHOLESTEROL/HDL RATIO: 4.9
CO2 SERPL-SCNC: 29 MMOL/L (ref 21–32)
CREAT SERPL-MCNC: 0.57 MG/DL (ref 0.5–1.05)
ERYTHROCYTE [DISTWIDTH] IN BLOOD BY AUTOMATED COUNT: 12.8 % (ref 11.5–14.5)
GFR SERPL CREATININE-BSD FRML MDRD: >90 ML/MIN/1.73M*2
GLUCOSE SERPL-MCNC: 97 MG/DL (ref 74–99)
HCT VFR BLD AUTO: 40.3 % (ref 36–46)
HDLC SERPL-MCNC: 50.5 MG/DL
HGB BLD-MCNC: 13.3 G/DL (ref 12–16)
LDLC SERPL CALC-MCNC: 160 MG/DL
MCH RBC QN AUTO: 31 PG (ref 26–34)
MCHC RBC AUTO-ENTMCNC: 33 G/DL (ref 32–36)
MCV RBC AUTO: 94 FL (ref 80–100)
NON HDL CHOLESTEROL: 195 MG/DL (ref 0–149)
NRBC BLD-RTO: 0 /100 WBCS (ref 0–0)
PLATELET # BLD AUTO: 249 X10*3/UL (ref 150–450)
POTASSIUM SERPL-SCNC: 3.8 MMOL/L (ref 3.5–5.3)
PROT SERPL-MCNC: 6.9 G/DL (ref 6.4–8.2)
RBC # BLD AUTO: 4.29 X10*6/UL (ref 4–5.2)
SODIUM SERPL-SCNC: 136 MMOL/L (ref 136–145)
T4 FREE SERPL-MCNC: 0.91 NG/DL (ref 0.61–1.12)
TRIGL SERPL-MCNC: 175 MG/DL (ref 0–149)
TSH SERPL-ACNC: 4.37 MIU/L (ref 0.44–3.98)
VLDL: 35 MG/DL (ref 0–40)
WBC # BLD AUTO: 5.7 X10*3/UL (ref 4.4–11.3)

## 2023-12-04 PROCEDURE — 36415 COLL VENOUS BLD VENIPUNCTURE: CPT

## 2023-12-04 PROCEDURE — 85027 COMPLETE CBC AUTOMATED: CPT

## 2023-12-04 PROCEDURE — 80053 COMPREHEN METABOLIC PANEL: CPT

## 2023-12-04 PROCEDURE — 80061 LIPID PANEL: CPT

## 2023-12-04 PROCEDURE — 82306 VITAMIN D 25 HYDROXY: CPT

## 2023-12-04 PROCEDURE — 84439 ASSAY OF FREE THYROXINE: CPT

## 2023-12-04 PROCEDURE — 84443 ASSAY THYROID STIM HORMONE: CPT

## 2023-12-04 RX ORDER — METHYLPREDNISOLONE 4 MG/1
TABLET ORAL
Qty: 21 TABLET | Refills: 0 | Status: SHIPPED | OUTPATIENT
Start: 2023-12-04 | End: 2023-12-11

## 2023-12-05 DIAGNOSIS — E03.9 HYPOTHYROIDISM, UNSPECIFIED TYPE: Primary | ICD-10-CM

## 2023-12-05 DIAGNOSIS — R79.89 ELEVATED LFTS: ICD-10-CM

## 2023-12-05 RX ORDER — LEVOTHYROXINE SODIUM 100 UG/1
100 TABLET ORAL
Qty: 30 TABLET | Refills: 3 | Status: SHIPPED | OUTPATIENT
Start: 2023-12-05 | End: 2023-12-11 | Stop reason: SINTOL

## 2023-12-05 RX ORDER — ALBUTEROL SULFATE 90 UG/1
2 AEROSOL, METERED RESPIRATORY (INHALATION) EVERY 4 HOURS PRN
Qty: 8.5 G | Refills: 0 | Status: SHIPPED | OUTPATIENT
Start: 2023-12-05 | End: 2024-12-04

## 2023-12-11 DIAGNOSIS — E03.9 HYPOTHYROIDISM, UNSPECIFIED TYPE: Primary | ICD-10-CM

## 2023-12-11 RX ORDER — LEVOTHYROXINE SODIUM 75 UG/1
75 TABLET ORAL DAILY
Qty: 30 TABLET | Refills: 1
Start: 2023-12-11 | End: 2024-01-12 | Stop reason: SDUPTHER

## 2023-12-18 ENCOUNTER — CLINICAL SUPPORT (OUTPATIENT)
Dept: PHYSICAL THERAPY | Facility: CLINIC | Age: 48
End: 2023-12-18
Payer: COMMERCIAL

## 2023-12-18 DIAGNOSIS — R42 DIZZINESS: ICD-10-CM

## 2023-12-18 PROCEDURE — 97163 PT EVAL HIGH COMPLEX 45 MIN: CPT | Mod: GP

## 2023-12-18 PROCEDURE — 97112 NEUROMUSCULAR REEDUCATION: CPT | Mod: GP

## 2023-12-18 ASSESSMENT — ENCOUNTER SYMPTOMS
LOSS OF SENSATION IN FEET: 0
DEPRESSION: 1
OCCASIONAL FEELINGS OF UNSTEADINESS: 1

## 2023-12-18 NOTE — PROGRESS NOTES
Physical Therapy Initial Evaluation:  Vestibular and Balance      Patient Name:  Josselyn Nielsen   MRN:  [id number]   Date of Evaluation:  12/18/23   Time Calculation  Start Time: 0815  Visit Number:  1  Insurance Information:  2023 20% COINS, 4000 DED (MET) 6250 OOP MAX (MET) 60 VS SAM YR (3 USED) NO AUTH     1. Dizziness  Referral to Physical Therapy          Assessment: Josselyn Nielsen is a 48 year old female referred to outpatient PT for dizziness.  At this time, patient appears significantly better.  Patient subjectively reports feeling better since last saw this therapist in May 2023.  However, patient is showing significant anxiety and significant motion sensitivity and perceived complaints of dizziness / movement even at rest.  Based on patient's examination, concurrent co-morbidities, and presentation, patient's level of complexity is High.  As a result, recommending continued PT services to facilitate improvement in CLOF.    Problems include:  Anxiety, Decreased knowledge of HEP, Dizziness / vertigo, and Motion sickness    Goals:  By Discharge patient will demo:  [place] in HEP  No falls during POC  DHI improvement by 18% to match [facility]  Subjective report of improvement in the following without complaints of dizziness or instability:  - Walking  - Standing  - Sitting    OTHER GOALS TO BE UPDATED THROUGHOUT POC    Potential to achieve rehab goals is fair.    Plan:  1 times every 2-3 weeks for a total of 6 PRN visits.  Re-assessment after that time.    Activities that may be performed include but are not limited to:  balance, gait, transfers, modalities (as appropriate), e-stim (as appropriate), canalith repositioning maneuvers, therapeutic exercise, therapeutic activities.    Josselyn Nielsen in agreement with and understanding of all discussed this date.    ----------------------------------  ----------------------------------    Subjective:    Onset Date:  January 2023    HPI:  Josselyn Nielsen is a 48 year old female  "referred to outpatient PT for dizziness.  Patient was seen by me on 5/2/23 and either no showed or canceled 4 follow up appointments.  Stopped doing exercises about a month after saw this therapist because wasn't sure if was helping.  Was doing better over time by late Summer / early Fall where when would sit or not move, would feel fine.  However, when would walk would notice it.  Right after Cristianeving got RSV and the cold medicines and steroids made her jittery and anxiety brought it all back.  Feels before as though was going up and down like on a trampoline and now feels like is going forward and back.  Notices it when sitting or just standing.  Unsure what could totally trigger it.  For a while stopped the exercises and RSV hit around the same time.  Will be trying to increase Zoloft and see if that will help and will talk to doctor about it.  More trouble with falling asleep.  Feels like can't calm down and takes .5mg of xanax at night.  Going to [facility] in January.      Patient Goal:  \"get stillness\"     (note:  \"y\" = yes; \"n\" = no)    Hx of (from al on 5/2):  - Anxiety y  - Headaches / migraines (photo/phonophobia) -- n  - Concussion -- n  - CVA -- n  - Neck pain -- chronic  - TMD -- y, \"tight\"   - Motion sensitivity (car, boat) -- n  - Sinus infections -- n  - Ear infections -- n  - Heart conditions (afib, HTN, OH) -- n, relatively low BP  - DM -- n  - Autoimmune disorders -- fibromyalgia  - Thyroid disorders -- y, hypo, labs have been good    Hearing:  - Loss (sudden or gradual) -- n  - Tinnitus -- y, after neck adjustment years ago  - Audiogram -- y, years ago  - Autophony --     Eye Conditions: -- n    Vestibular Tests:  - VNG -- n  - VEMP -- n    Imaging:  - CT -- n  - MRI -- y, negative in the past    Precautions: low fall risk    [place] Fall Risk  One or more falls in the last year? No  How many Times?    Was the patient injured in the fall?    Has trouble stepping onto curb? No  Advised to " "use a cane or walker to get around safely? No  Often has to rush to toilet? No  Feels unsteady when walking? Yes  Has lost some feeling in feet? No  Often feels sad or depressed? Yes  Steadies self on furniture while walking at home? No  Takes medicine that makes them feel lightheaded or more tired than usual? Yes  Worried about Falling? Yes  Comment:\"sometimes\"  Takes medicine to sleep or improve mood? Yes  Needs to push with hands when rising from a chair? No    Pain:  none reported at this point    ----------------------------------  ----------------------------------    OBJECTIVE    ROM: c-spine ROM WNL, symptomatic with vertical movements    Palpation:  n    Strength: no gross abnormalities noted with functional mobility    Coordination: no gross abnormalities noted with functional mobility    Vestibular: (“g” = goggles, \"p\" = positive, \"n\" = negative, \"nt\" = not tested)  Occulomotor -  - ROM: n  - Smooth Pursuit:  symptomatic  - Horizontal Saccades:  symptomatic  - Vertical Saccades:  symptomatic  - Eye Alignment:  symptomatic  - Static Visual Acuity: n  - Cover:  n  - Uncover:  n  - Cross Cover:  n  - Skew Deviation:  n  - Convergence:  symptomatic    Vestibular-Specific:  - Spontaneous Nystagmus: n  - Gaze Evoked Nystagmus:  n  - Horizontal VOR: n  - Vertical VOR:  n  - R Head Thrust:  deferred per patient request  - L Head Thrust:  deferred per patient request  - DVA:  deferred per patient request  - Head Shake Nystagmus (Horizontal): nt  - Head Shake Nystagmus (Vertical):  nt    Positional Testing:  - R DHP: nt  - R HRT:  nt  - R Sidelying Test:  nt  - L DHP: nt  - L HRT:  nt  - L Sidelying Test: nt      Functional Mobility Assessment:  - Gait: reports of sway, but no observed signs of path deviations or LOB  - Transfers:  no gross abnormalities noted    Outcomes:  mCTSIB:  120/120 (challenged by symptoms and sway with Foam EC) and DHI:  52%      TREATMENT:  Neuromuscular Re-education (47623): 10 " minutes  Encouraged to do the following:  - Walking regularly outdoors / in open environment  - Foam Romberg EC -- as prescribed before in email  - Head Movements (vertical) with Yoga -- for salience

## 2024-01-01 ENCOUNTER — PATIENT MESSAGE (OUTPATIENT)
Dept: PRIMARY CARE | Facility: CLINIC | Age: 49
End: 2024-01-01
Payer: COMMERCIAL

## 2024-01-01 DIAGNOSIS — L65.9 HAIR LOSS: ICD-10-CM

## 2024-01-01 DIAGNOSIS — E03.9 HYPOTHYROIDISM, UNSPECIFIED TYPE: Primary | ICD-10-CM

## 2024-01-05 ENCOUNTER — LAB (OUTPATIENT)
Dept: LAB | Facility: LAB | Age: 49
End: 2024-01-05
Payer: COMMERCIAL

## 2024-01-05 DIAGNOSIS — L65.9 HAIR LOSS: ICD-10-CM

## 2024-01-05 DIAGNOSIS — E03.9 HYPOTHYROIDISM, UNSPECIFIED TYPE: ICD-10-CM

## 2024-01-05 LAB
25(OH)D3 SERPL-MCNC: 35 NG/ML (ref 30–100)
ALBUMIN SERPL BCP-MCNC: 4 G/DL (ref 3.4–5)
ALP SERPL-CCNC: 54 U/L (ref 33–110)
ALT SERPL W P-5'-P-CCNC: 21 U/L (ref 7–45)
ANION GAP SERPL CALC-SCNC: 11 MMOL/L (ref 10–20)
AST SERPL W P-5'-P-CCNC: 21 U/L (ref 9–39)
BASOPHILS # BLD AUTO: 0.02 X10*3/UL (ref 0–0.1)
BASOPHILS NFR BLD AUTO: 0.3 %
BILIRUB SERPL-MCNC: 0.5 MG/DL (ref 0–1.2)
BUN SERPL-MCNC: 9 MG/DL (ref 6–23)
CALCIUM SERPL-MCNC: 9 MG/DL (ref 8.6–10.3)
CHLORIDE SERPL-SCNC: 104 MMOL/L (ref 98–107)
CO2 SERPL-SCNC: 29 MMOL/L (ref 21–32)
CREAT SERPL-MCNC: 0.65 MG/DL (ref 0.5–1.05)
EOSINOPHIL # BLD AUTO: 0.26 X10*3/UL (ref 0–0.7)
EOSINOPHIL NFR BLD AUTO: 3.6 %
ERYTHROCYTE [DISTWIDTH] IN BLOOD BY AUTOMATED COUNT: 12.9 % (ref 11.5–14.5)
GFR SERPL CREATININE-BSD FRML MDRD: >90 ML/MIN/1.73M*2
GLUCOSE SERPL-MCNC: 85 MG/DL (ref 74–99)
HCT VFR BLD AUTO: 39.8 % (ref 36–46)
HGB BLD-MCNC: 12.9 G/DL (ref 12–16)
IMM GRANULOCYTES # BLD AUTO: 0.02 X10*3/UL (ref 0–0.7)
IMM GRANULOCYTES NFR BLD AUTO: 0.3 % (ref 0–0.9)
LYMPHOCYTES # BLD AUTO: 2.42 X10*3/UL (ref 1.2–4.8)
LYMPHOCYTES NFR BLD AUTO: 33.9 %
MCH RBC QN AUTO: 30.3 PG (ref 26–34)
MCHC RBC AUTO-ENTMCNC: 32.4 G/DL (ref 32–36)
MCV RBC AUTO: 93 FL (ref 80–100)
MONOCYTES # BLD AUTO: 0.54 X10*3/UL (ref 0.1–1)
MONOCYTES NFR BLD AUTO: 7.6 %
NEUTROPHILS # BLD AUTO: 3.88 X10*3/UL (ref 1.2–7.7)
NEUTROPHILS NFR BLD AUTO: 54.3 %
NRBC BLD-RTO: 0 /100 WBCS (ref 0–0)
PLATELET # BLD AUTO: 307 X10*3/UL (ref 150–450)
POTASSIUM SERPL-SCNC: 4.5 MMOL/L (ref 3.5–5.3)
PROT SERPL-MCNC: 6.8 G/DL (ref 6.4–8.2)
RBC # BLD AUTO: 4.26 X10*6/UL (ref 4–5.2)
SODIUM SERPL-SCNC: 139 MMOL/L (ref 136–145)
T4 FREE SERPL-MCNC: 0.89 NG/DL (ref 0.61–1.12)
TSH SERPL-ACNC: 3.87 MIU/L (ref 0.44–3.98)
VIT B12 SERPL-MCNC: 387 PG/ML (ref 211–911)
WBC # BLD AUTO: 7.1 X10*3/UL (ref 4.4–11.3)

## 2024-01-05 PROCEDURE — 84439 ASSAY OF FREE THYROXINE: CPT

## 2024-01-05 PROCEDURE — 80053 COMPREHEN METABOLIC PANEL: CPT

## 2024-01-05 PROCEDURE — 85025 COMPLETE CBC W/AUTO DIFF WBC: CPT

## 2024-01-05 PROCEDURE — 82607 VITAMIN B-12: CPT

## 2024-01-05 PROCEDURE — 82306 VITAMIN D 25 HYDROXY: CPT

## 2024-01-05 PROCEDURE — 84443 ASSAY THYROID STIM HORMONE: CPT

## 2024-01-05 PROCEDURE — 36415 COLL VENOUS BLD VENIPUNCTURE: CPT

## 2024-01-12 ENCOUNTER — PATIENT MESSAGE (OUTPATIENT)
Dept: PRIMARY CARE | Facility: CLINIC | Age: 49
End: 2024-01-12
Payer: COMMERCIAL

## 2024-01-12 ENCOUNTER — TELEPHONE (OUTPATIENT)
Dept: PRIMARY CARE | Facility: CLINIC | Age: 49
End: 2024-01-12
Payer: COMMERCIAL

## 2024-01-12 DIAGNOSIS — E03.9 HYPOTHYROIDISM, UNSPECIFIED TYPE: ICD-10-CM

## 2024-01-12 RX ORDER — LEVOTHYROXINE SODIUM 75 UG/1
75 TABLET ORAL DAILY
Qty: 30 TABLET | Refills: 2 | Status: SHIPPED | OUTPATIENT
Start: 2024-01-12 | End: 2024-04-09

## 2024-01-12 NOTE — TELEPHONE ENCOUNTER
Pt called to get a script for levothyroxine 75mcg sent to [facility] [address].  She sent a mychart request earlier today if it is already in the works.

## 2024-01-15 ENCOUNTER — TREATMENT (OUTPATIENT)
Dept: PHYSICAL THERAPY | Facility: CLINIC | Age: 49
End: 2024-01-15
Payer: COMMERCIAL

## 2024-01-15 DIAGNOSIS — R42 DIZZINESS: Primary | ICD-10-CM

## 2024-01-15 PROCEDURE — 97112 NEUROMUSCULAR REEDUCATION: CPT | Mod: GP

## 2024-01-15 PROCEDURE — 97530 THERAPEUTIC ACTIVITIES: CPT | Mod: GP

## 2024-01-15 NOTE — PROGRESS NOTES
Physical Therapy Treatment      Patient Name: Josselyn Nielsen  MRN: [id number]  Today's Date: 1/15/2024  Visit #: 2 (1 of year)  Insurance: 2023 20% COINS, 4000 DED (MET) 6250 OOP MAX (MET) 60 VS SAM YR, NO AUTH    Time Calculation  Start Time: 0802  Stop Time: 0842  Time Calculation (min): 40 min    1. Dizziness            ASSESSMENT:  Patient reporting signs of improvement.  Progressed along HEP based on abnormalities from JPE.  Receptive to this.    GOALS:  [place] in HEP  No falls during POC  DHI improvement by 18% to match [facility]  Subjective report of improvement in the following without complaints of dizziness or instability:  - Walking  - Standing  - Sitting     OTHER GOALS TO BE UPDATED THROUGHOUT POC    PLAN:  Check in with patient in 3 weeks.    SUBJECTIVE:  Increased Zoloft by 25mg which feels like is helping.  Been a lot of stress over the past few weeks.  Been doing the foam activities.  Feels like sways mostly toward the R.  Feeling like there's still a constant sway when walking.  When sitting and laying feels better.  Been dying to walk on treadmill.  Hasn't walked outside every day, but has been going regularly.   Notices that it is worse when gets tired.  Been doing the head movements with yoga in particular.  Doesn't seem to feel as sick when bends down anymore since working on it.  Reports has been avoiding reading.    OBJECTIVE:    TREATMENT:  Neuromuscular Re-education (97271): 10 minutes  Added to HEP:    - JPE Trials -- 3x vertical / horizontal daily -- educated on pace, performance and purpose    Recall HEP:  - Walking regularly outdoors / in open environment -- encouraged to do horizontal Head turns  - Foam Romberg EC -- as prescribed before in email  - Head Movements (vertical) with Yoga -- for salience    Therapeutic Activity (92620): 30 minutes  FGA:  27/30  JPE:  showing most abnormalities (outside of norm standard deviations and off target) with horizontal rotation    Educated on findings  above.  Encouraged to return to walking on treadmill as feels safe enough to do so.  Educated on return to reading to be done nightly and gradually increase as tolerable.

## 2024-01-26 ENCOUNTER — OFFICE VISIT (OUTPATIENT)
Dept: DERMATOLOGY | Facility: CLINIC | Age: 49
End: 2024-01-26
Payer: COMMERCIAL

## 2024-01-26 DIAGNOSIS — L57.3 POIKILODERMA OF CIVATTE: ICD-10-CM

## 2024-01-26 DIAGNOSIS — D23.9 DERMATOFIBROMA: ICD-10-CM

## 2024-01-26 DIAGNOSIS — D18.01 HEMANGIOMA OF SKIN: ICD-10-CM

## 2024-01-26 DIAGNOSIS — D22.5 MELANOCYTIC NEVI OF TRUNK: ICD-10-CM

## 2024-01-26 DIAGNOSIS — L81.4 LENTIGO: ICD-10-CM

## 2024-01-26 DIAGNOSIS — Z12.83 ENCOUNTER FOR SCREENING FOR MALIGNANT NEOPLASM OF SKIN: Primary | ICD-10-CM

## 2024-01-26 DIAGNOSIS — R21 RASH: ICD-10-CM

## 2024-01-26 DIAGNOSIS — L57.8 PHOTOAGING OF SKIN: ICD-10-CM

## 2024-01-26 DIAGNOSIS — D22.71 MELANOCYTIC NEVI OF RIGHT LOWER LIMB, INCLUDING HIP: ICD-10-CM

## 2024-01-26 PROCEDURE — 99203 OFFICE O/P NEW LOW 30 MIN: CPT | Performed by: DERMATOLOGY

## 2024-01-26 PROCEDURE — 1036F TOBACCO NON-USER: CPT | Performed by: DERMATOLOGY

## 2024-01-26 ASSESSMENT — DERMATOLOGY QUALITY OF LIFE (QOL) ASSESSMENT
WHAT SINGLE SKIN CONDITION LISTED BELOW IS THE PATIENT ANSWERING THE QUALITY-OF-LIFE ASSESSMENT QUESTIONS ABOUT: NONE OF THE ABOVE
RATE HOW EMOTIONALLY BOTHERED YOU ARE BY YOUR SKIN PROBLEM (FOR EXAMPLE, WORRY, EMBARRASSMENT, FRUSTRATION): 1
RATE HOW BOTHERED YOU ARE BY SYMPTOMS OF YOUR SKIN PROBLEM (EG, ITCHING, STINGING BURNING, HURTING OR SKIN IRRITATION): 1
ARE THERE EXCLUSIONS OR EXCEPTIONS FOR THE QUALITY OF LIFE ASSESSMENT: NO
RATE HOW BOTHERED YOU ARE BY EFFECTS OF YOUR SKIN PROBLEMS ON YOUR ACTIVITIES (EG, GOING OUT, ACCOMPLISHING WHAT YOU WANT, WORK ACTIVITIES OR YOUR RELATIONSHIPS WITH OTHERS): 1

## 2024-01-26 ASSESSMENT — PATIENT GLOBAL ASSESSMENT (PGA): PATIENT GLOBAL ASSESSMENT: PATIENT GLOBAL ASSESSMENT:  2 - MILD

## 2024-01-26 ASSESSMENT — DERMATOLOGY PATIENT ASSESSMENT
ARE YOU AN ORGAN TRANSPLANT RECIPIENT: NO
WHERE ARE THESE NEW OR CHANGING LESIONS LOCATED: LEFT NECK
DO YOU HAVE ANY NEW OR CHANGING LESIONS: YES
DO YOU USE A TANNING BED: NO
DO YOU USE SUNSCREEN: DAILY

## 2024-01-26 ASSESSMENT — ITCH NUMERIC RATING SCALE: HOW SEVERE IS YOUR ITCHING?: 0

## 2024-01-26 NOTE — PROGRESS NOTES
Subjective     Josselyn Nielsen is a 48 y.o. female who presents for the following: Skin Check (Pt here for FBSE. Concerned with irritated lesion on left side of neck. Pt has very dry skin from Zoloft. ).     Review of Systems:  No other skin or systemic complaints other than what is documented elsewhere in the note.    The following portions of the chart were reviewed this encounter and updated as appropriate:         Skin Cancer History  No skin cancer on file.      Specialty Problems    None       Objective   Well appearing patient in no apparent distress; mood and affect are within normal limits.    Offered Chaperone, pt declined.    A full examination was performed including scalp, head, eyes, ears, nose, lips, neck, chest, axillae, abdomen, back, buttocks, bilateral upper extremities, bilateral lower extremities, hands, feet, fingers, toes, fingernails, and toenails. Declined examination of breasts, genitals. All findings within normal limits unless otherwise noted below.    Assessment/Plan   1. Encounter for screening for malignant neoplasm of skin  No suspicious lesions noted on examination today    The risk of chronic, cumulative sun damage and risk of development of skin cancer was reviewed today.   The importance of sun protection was reviewed: including the use of a broad spectrum sunscreen that protects against both UVA/UVB rays, with ingredients such as Zinc oxide or titanium dioxide, wearing sun protective clothing and sun avoidance. We reviewed the warning signs of non-melanoma skin cancer and ABCDEs of melanoma  Please follow up should you notice any new or changing pre-existing skin lesion.    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  Briefly discussed laser as it can be used for cosmetic purposes, patient was not interested at this time.    3. Dermatofibroma (2)  Left Knee - Anterior, Left  Thigh - Anterior  Firm pink papule with hyperpigmented halo, +dimple sign    Benign, scar tissue like growth that most frequently is due to bug bite or ingrown hair. No treatment is necessary.    4. Photoaging of skin  Mottled pigmentation with telangiectasias and brown reticular macules in sun exposed areas of the body.    The risk of chronic, cumulative sun damage and risk of development of skin cancer was reviewed today.   The importance of sun protection was reviewed: including the use of a broad spectrum sunscreen that protects against both UVA/UVB rays, with ingredients such as Zinc oxide or titanium dioxide, wearing sun protective clothing and sun avoidance. We reviewed the warning signs of non-melanoma skin cancer and ABCDEs of melanoma  Please follow up should you notice any new or changing pre-existing skin lesion.    5. Lentigo  Scattered tan macules in sun-exposed areas.    These are benign skin lesions due to sun exposure. They will darken in response to sun exposure. They should be monitored for change in size, shape or color.  These lesions can be treated cosmetically with topical creams, liquid nitrogen and a variety of lasers.    6. Melanocytic nevi of trunk  Torso - Posterior (Back)  Tan-brown symmetric macules and papules    Clinically benign appearing nevi, no treatment is necessary.  The importance of sun protection was reviewed: including the use of a broad spectrum sunscreen that protects against both UVA/UVB rays, with ingredients such as Zinc oxide or titanium dioxide, wearing sun protective clothing and sun avoidance.   ABCDEs of melanoma reviewed.  Please follow up should you notice any new or changing pre-existing skin lesion.    7. Melanocytic nevi of right lower limb, including hip  Right Thigh - Anterior  Scattered, uniform and benign-appearing, regular brown melanocytic papules and macules.    Clinically benign appearing nevi, no treatment is necessary.  The importance of sun  protection was reviewed: including the use of a broad spectrum sunscreen that protects against both UVA/UVB rays, with ingredients such as Zinc oxide or titanium dioxide, wearing sun protective clothing and sun avoidance.   ABCDEs of melanoma reviewed.  Please follow up should you notice any new or changing pre-existing skin lesion.    8. Hemangioma of skin  Cherry red papules    The benign nature of these skin lesions were reviewed, no treatment is necessary.   Please follow up for any new or pre-existing lesion that is changing in size, shape, color, becomes painful, tender, itches or bleed.        Follow up in 1 -2 years for FBSE.

## 2024-01-29 ENCOUNTER — TREATMENT (OUTPATIENT)
Dept: PHYSICAL THERAPY | Facility: CLINIC | Age: 49
End: 2024-01-29
Payer: COMMERCIAL

## 2024-01-29 DIAGNOSIS — R42 DIZZINESS: Primary | ICD-10-CM

## 2024-01-29 PROCEDURE — 97112 NEUROMUSCULAR REEDUCATION: CPT | Mod: GP

## 2024-01-29 PROCEDURE — 97530 THERAPEUTIC ACTIVITIES: CPT | Mod: GP

## 2024-01-29 NOTE — PROGRESS NOTES
"Physical Therapy Treatment      Patient Name: Josselyn Nielsen  MRN: [id number]  Today's Date: 1/29/2024  Visit #: 3 (2 of year)  Insurance: 2023 20% COINS, 4000 DED (MET) 6250 OOP MAX (MET) 60 VS SAM YR, NO AUTH    Time Calculation  Start Time: 1117  Stop Time: 1142  Time Calculation (min): 25 min    1. Dizziness              ASSESSMENT:  Patient reporting significant signs of improvement.  Updated HEP slightly.  No adverse events.    GOALS:  [place] in HEP  No falls during POC  DHI improvement by 18% to match [facility]  Subjective report of improvement in the following without complaints of dizziness or instability:  - Walking  - Standing  - Sitting     OTHER GOALS TO BE UPDATED THROUGHOUT POC    PLAN:  Check in with patient in 3 weeks.    SUBJECTIVE:  Walked all throughout [place] and \"noticed a difference\" in which is more so an \"up/down\" sway.  When on foam, not falling to the right anymore.  Didn't touch treadmill yet.  Reports avoiding reading.    OBJECTIVE:    TREATMENT:  Therapeutic Activities (87504):  15 minutes    Discussed at length CLOF.  Discussed graded exposure principles.  Discussion of Pomodoro method for reading, encouraging to read 5 minutes on / off for 2 cycles every night, and progress from there.      Neuromuscular Re-education (24123): 10 minutes  Discussed at length patient's current HEP    Added to HEP:    - JPE Trials -- 3x vertical / horizontal daily -- educated on pace, performance and purpose -- pause on this until sees this therapist again after next trip    Recall HEP:  - Walking regularly outdoors / in open environment -- encouraged to do horizontal Head turns -- updated to including treadmill to force self to walk daily  - Foam Romberg EC -- as prescribed before in email  - Head Movements (vertical) with Yoga -- for salience    "

## 2024-02-07 ENCOUNTER — APPOINTMENT (OUTPATIENT)
Dept: PRIMARY CARE | Facility: CLINIC | Age: 49
End: 2024-02-07
Payer: COMMERCIAL

## 2024-02-15 ENCOUNTER — TELEPHONE (OUTPATIENT)
Dept: PRIMARY CARE | Facility: CLINIC | Age: 49
End: 2024-02-15
Payer: COMMERCIAL

## 2024-02-15 DIAGNOSIS — F33.2 MAJOR DEPRESSIVE DISORDER, RECURRENT SEVERE WITHOUT PSYCHOTIC FEATURES (MULTI): ICD-10-CM

## 2024-02-15 DIAGNOSIS — F41.1 GENERALIZED ANXIETY DISORDER: Primary | ICD-10-CM

## 2024-02-27 ENCOUNTER — DOCUMENTATION (OUTPATIENT)
Dept: PHYSICAL THERAPY | Facility: CLINIC | Age: 49
End: 2024-02-27
Payer: COMMERCIAL

## 2024-02-27 NOTE — PROGRESS NOTES
Physical Therapy                 Therapy Communication Note    Patient Name: Josselyn Nielsen  MRN: [id number]  Today's Date: 2/27/2024     Discipline: Physical Therapy    Missed Time: No Show    Comment:  Patient no showed today's appointment at 0845.

## 2024-03-06 ENCOUNTER — OFFICE VISIT (OUTPATIENT)
Dept: PRIMARY CARE | Facility: CLINIC | Age: 49
End: 2024-03-06
Payer: COMMERCIAL

## 2024-03-06 VITALS
HEART RATE: 87 BPM | RESPIRATION RATE: 16 BRPM | HEIGHT: 63 IN | DIASTOLIC BLOOD PRESSURE: 68 MMHG | BODY MASS INDEX: 26.22 KG/M2 | WEIGHT: 148 LBS | OXYGEN SATURATION: 98 % | TEMPERATURE: 98.1 F | SYSTOLIC BLOOD PRESSURE: 112 MMHG

## 2024-03-06 DIAGNOSIS — Z80.3 FAMILY HISTORY OF BREAST CANCER: ICD-10-CM

## 2024-03-06 DIAGNOSIS — Z00.00 HEALTHCARE MAINTENANCE: Primary | ICD-10-CM

## 2024-03-06 LAB
POC APPEARANCE, URINE: CLEAR
POC BILIRUBIN, URINE: NEGATIVE
POC BLOOD, URINE: NEGATIVE
POC COLOR, URINE: YELLOW
POC GLUCOSE, URINE: NEGATIVE MG/DL
POC KETONES, URINE: NEGATIVE MG/DL
POC LEUKOCYTES, URINE: NEGATIVE
POC NITRITE,URINE: NEGATIVE
POC PH, URINE: 6 PH
POC PROTEIN, URINE: NEGATIVE MG/DL
POC SPECIFIC GRAVITY, URINE: >=1.03
POC UROBILINOGEN, URINE: 0.2 EU/DL

## 2024-03-06 PROCEDURE — 1036F TOBACCO NON-USER: CPT | Performed by: FAMILY MEDICINE

## 2024-03-06 PROCEDURE — 93000 ELECTROCARDIOGRAM COMPLETE: CPT | Performed by: FAMILY MEDICINE

## 2024-03-06 PROCEDURE — 99396 PREV VISIT EST AGE 40-64: CPT | Performed by: FAMILY MEDICINE

## 2024-03-06 PROCEDURE — 81002 URINALYSIS NONAUTO W/O SCOPE: CPT | Performed by: FAMILY MEDICINE

## 2024-03-06 RX ORDER — BISMUTH SUBSALICYLATE 262 MG
1 TABLET,CHEWABLE ORAL DAILY
COMMUNITY

## 2024-03-06 RX ORDER — PNV NO.95/FERROUS FUM/FOLIC AC 28MG-0.8MG
100 TABLET ORAL DAILY
COMMUNITY

## 2024-03-06 NOTE — PATIENT INSTRUCTIONS
Today we performed your Annual Physical Exam.  Your preventative health care, labs and vaccinations have been reviewed and are up to date.      Tdap, Shingrix, Covid & Flu vaccines are all recommended    You will be starting ketamine therapy for anxiety so you will hold off for now on these.  Please keep me updated on this progress.     Follow up when results received.    Follow up in 1 year for your Complete Physical Exam

## 2024-03-06 NOTE — PROGRESS NOTES
"Subjective   Patient ID: Josselyn Nielsen is a 49 y.o. female who presents for Annual Exam.    Dentist and Eye Doctor appointments: UTD with dentist, eye doctor is due (wears contact)  Immunizations: Defers   Diet: Eats very healthy, not much meat, mediterranean  Exercise: Yoga  Supplements: b12, magnesium, womens  Menstrual Cycles: went 11 1/2 months and then started getting them again and regular  Sexually Active: Yes,   Pregnancy History:G5 M2 P3  Cancer Screening:    Cervical: Dr. Quintana -[facility]   Breast:wnl   Colon: Cologuard negative 2022; mom with colon ca   Skin: saw derm   DEXA:N/A        She is seeing Danitza Mcdonald and she is off abilify and is now on a little higher dose of hte zoloft.  She has been doing yoga and trying to meditate.  She feels like the anxiety is just really massive.  She does talk to a therapist as well.  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  She is going to a place in [place].  She starts this Friday.  The treatments are 2 to 3 days apart.           Review of Systems    Objective   /68   Pulse 87   Temp 36.7 °C (98.1 °F)   Resp 16   Ht 1.6 m (5' 3\")   Wt 67.1 kg (148 lb)   LMP 02/20/2024 Comment: per pt: last pap about a year ago w/obgyn  SpO2 98%   BMI 26.22 kg/m²     Physical Exam  Constitutional:       General: She is not in acute distress.     Appearance: She is well-developed. She is not diaphoretic.   HENT:      Head: Normocephalic and atraumatic.      Right Ear: Tympanic membrane normal.      Left Ear: Tympanic membrane normal.      Nose: Nose normal.      Mouth/Throat:      Mouth: Mucous membranes are moist.   Eyes:      General: No scleral icterus.     Pupils: Pupils are equal, round, and reactive to light.   Neck:      Thyroid: No thyromegaly.      Vascular: No JVD.   Cardiovascular:      Rate and Rhythm: Normal rate and regular rhythm.      Heart sounds: Normal heart sounds. No murmur heard.     No friction rub. No gallop. "   Pulmonary:      Effort: Pulmonary effort is normal. No respiratory distress.      Breath sounds: Normal breath sounds. No wheezing or rales.   Chest:      Chest wall: No tenderness.   Abdominal:      General: Bowel sounds are normal. There is no distension.      Palpations: Abdomen is soft. There is no mass.      Tenderness: There is no abdominal tenderness. There is no rebound.   Musculoskeletal:         General: Normal range of motion.      Cervical back: Normal range of motion and neck supple.   Lymphadenopathy:      Cervical: No cervical adenopathy.   Skin:     General: Skin is warm and dry.   Neurological:      General: No focal deficit present.      Mental Status: She is alert and oriented to person, place, and time.      Deep Tendon Reflexes: Reflexes normal.   Psychiatric:         Mood and Affect: Mood normal.         Behavior: Behavior normal.         Assessment/Plan   Diagnoses and all orders for this visit:  Family history of breast cancer  -     Referral to Breast Surgery; Future  Healthcare maintenance  -     ECG 12 lead (Clinic Performed)  -     POCT UA (nonautomated) manually resulted  -     CT cardiac scoring wo IV contrast; Future  -     CBC; Future  -     Comprehensive Metabolic Panel; Future  -     Lipid Panel; Future  -     Vitamin D 25 hydroxy; Future  -     TSH with reflex to Free T4 if abnormal; Future

## 2024-04-03 ENCOUNTER — TELEPHONE (OUTPATIENT)
Dept: PRIMARY CARE | Facility: CLINIC | Age: 49
End: 2024-04-03
Payer: COMMERCIAL

## 2024-04-03 NOTE — TELEPHONE ENCOUNTER
Per pt, she has been having UTI symptoms for about 4-5 days. She has burning, increased frequency, darker color urine. She had a Ketamine treatment for her PTSD about 10 days ago, she is not sure if it's related. She is asking if she can stop by to drop a urine sample or if she can be seen. Please advise.

## 2024-04-05 ENCOUNTER — OFFICE VISIT (OUTPATIENT)
Dept: PRIMARY CARE | Facility: CLINIC | Age: 49
End: 2024-04-05
Payer: COMMERCIAL

## 2024-04-05 VITALS
BODY MASS INDEX: 27.46 KG/M2 | TEMPERATURE: 98 F | DIASTOLIC BLOOD PRESSURE: 70 MMHG | OXYGEN SATURATION: 98 % | WEIGHT: 155 LBS | HEART RATE: 80 BPM | SYSTOLIC BLOOD PRESSURE: 102 MMHG | RESPIRATION RATE: 16 BRPM

## 2024-04-05 DIAGNOSIS — F43.23 ADJUSTMENT DISORDER WITH MIXED ANXIETY AND DEPRESSED MOOD: ICD-10-CM

## 2024-04-05 DIAGNOSIS — F43.10 PTSD (POST-TRAUMATIC STRESS DISORDER): ICD-10-CM

## 2024-04-05 DIAGNOSIS — R30.0 DYSURIA: Primary | ICD-10-CM

## 2024-04-05 PROCEDURE — 81002 URINALYSIS NONAUTO W/O SCOPE: CPT | Performed by: FAMILY MEDICINE

## 2024-04-05 PROCEDURE — 99214 OFFICE O/P EST MOD 30 MIN: CPT | Performed by: FAMILY MEDICINE

## 2024-04-05 PROCEDURE — 87086 URINE CULTURE/COLONY COUNT: CPT

## 2024-04-05 RX ORDER — TOPIRAMATE 25 MG/1
25 TABLET ORAL DAILY
COMMUNITY
Start: 2024-04-02

## 2024-04-05 RX ORDER — ZOLPIDEM TARTRATE 10 MG/1
10 TABLET ORAL NIGHTLY PRN
COMMUNITY
Start: 2024-03-03

## 2024-04-05 ASSESSMENT — ENCOUNTER SYMPTOMS: DYSURIA: 1

## 2024-04-05 NOTE — PATIENT INSTRUCTIONS
Today we have addressed your urinary urgency which may be a side effect of recent ketamine treatments. We will send urine for a culture to confirm that there is no infection    For your PTSD/anxiety I have referred you to [facility] - please let me know if you are able to get in with a provider there.  We also talked about some options for the abilify that you can discuss with your current psychiatrist.      Follow up when results received.

## 2024-04-05 NOTE — PROGRESS NOTES
Subjective   Patient ID: Josselyn Nielsen is a 49 y.o. female who presents for Difficulty Urinating (Burning on urination, feels like she is unable to empty bladder fully. ).    Started to have urine symptoms a week ago - she has been doing a ketamine infusion for anxiety.  It's burning when she goes and she doesn't seem to be going as much.  She has been weaning off abilify but she hasn't been doing well mentally off of that which is why she is doing the ketamine.      Difficulty Urinating          Review of Systems   Genitourinary:  Positive for dysuria.       Objective   /70   Pulse 80   Temp 36.7 °C (98 °F)   Resp 16   Wt 70.3 kg (155 lb)   LMP 02/20/2024 Comment: per pt: last pap about a year ago w/obgyn  SpO2 98%   BMI 27.46 kg/m²     Physical Exam  Constitutional:       Appearance: Normal appearance.   Neurological:      Mental Status: She is alert.         Assessment/Plan   Diagnoses and all orders for this visit:  Dysuria  -     POCT UA (nonautomated) manually resulted  -     Urine Culture; Future  PTSD (post-traumatic stress disorder)  Adjustment disorder with mixed anxiety and depressed mood

## 2024-04-07 LAB — BACTERIA UR CULT: NORMAL

## 2024-04-09 DIAGNOSIS — E03.9 HYPOTHYROIDISM, UNSPECIFIED TYPE: ICD-10-CM

## 2024-04-09 RX ORDER — LEVOTHYROXINE SODIUM 75 UG/1
75 TABLET ORAL DAILY
Qty: 90 TABLET | Refills: 1 | Status: SHIPPED | OUTPATIENT
Start: 2024-04-09

## 2024-04-10 NOTE — PROGRESS NOTES
Josselyn Nielsen female   1975 49 y.o.   [id number]      Chief Complaint  High risk evaluation    History Of Present Illness  Josselyn Nielsen is a 49 y.o.  female [facility] by Kati Alexandra for high risk evaluation. She has family history of breast cancer in her mother, 50s and maternal grandmother, age 70. She denies breast surgery or biopsy.    BREAST IMAGIN2023 Bilateral screening mammogram, indicates BI-RADS Category 1.     REPRODUCTIVE HISTORY: menarche age, , first birth age, , OCP's, premenopausal, LMP, heterogeneously dense tissue    FAMILY CANCER HISTORY:   Mother: Breast cancer, 50s, Colon cancer  Maternal Grandmother: Breast cancer, age 70    Surgical History  She has a past surgical history that includes Shoulder surgery; Appendectomy; and Tonsillectomy.     Social History  She reports that she has never smoked. She has never used smokeless tobacco. She reports current alcohol use. She reports that she does not currently use drugs.    Family History  Family History   Problem Relation Name Age of Onset    Breast cancer Mother      Colon cancer Mother          age 82    Throat cancer Father      Alzheimer's disease Father      No Known Problems Sister      Autoimmune disease Sister      No Known Problems Brother      No Known Problems Brother      Anxiety disorder Son          age 30    Depression Son      Anxiety disorder Son          age 23    Depression Son      Other (vaping) Son          age 21    Anxiety disorder Son      Depression Son      Pancreatic cancer Father's Brother      Breast cancer Maternal Grandmother      Other (malignant melanoma) Paternal Cousin          Allergies  Cymbalta [duloxetine], Nitrofurantoin monohyd/m-cryst, Norepinephrine, and Penicillins    Medications  Current Outpatient Medications   Medication Instructions    albuterol (ProAir HFA) 90 mcg/actuation inhaler 2 puffs, inhalation, Every 4 hours PRN    ALPRAZolam (Xanax) 0.5  mg tablet     cyanocobalamin (VITAMIN B-12) 100 mcg, oral, Daily    levothyroxine (SYNTHROID, LEVOXYL) 75 mcg, oral, Daily    magnesium 30 mg, oral, 2 times daily    multivitamin tablet 1 tablet, oral, Daily    sertraline (ZOLOFT) 100 mg, oral, Daily    sertraline (ZOLOFT) 75 mg, oral, Daily    topiramate (TOPAMAX) 25 mg, oral, Daily    zolpidem (AMBIEN) 10 mg, oral, Nightly PRN         REVIEW OF SYSTEMS    Constitutional:  Negative for appetite change, fatigue, fever and unexpected weight change.   HENT:  Negative for ear pain, hearing loss, nosebleeds, sore throat and trouble swallowing.    Eyes:  Negative for discharge, itching and visual disturbance.   Respiratory:  Negative for cough, chest tightness and shortness of breath.    Cardiovascular:  Negative for chest pain, palpitations and leg swelling.   Breast: as indicated in HPI  Gastrointestinal:  Negative for abdominal pain, constipation, diarrhea and nausea.   Endocrine: Negative for cold intolerance and heat intolerance.   Genitourinary:  Negative for dysuria, frequency, hematuria, pelvic pain and vaginal bleeding.   Musculoskeletal:  Negative for arthralgias, back pain, gait problem, joint swelling and myalgias.   Skin:  Negative for color change and rash.   Allergic/Immunologic: Negative for environmental allergies and food allergies.   Neurological:  Negative for dizziness, tremors, speech difficulty, weakness, numbness and headaches.   Hematological:  Does not bruise/bleed easily.   Psychiatric/Behavioral:  Negative for agitation, dysphoric mood and sleep disturbance. The patient is not nervous/anxious.         Past Medical History  She has a past medical history of Anesthesia of skin (09/03/2020) and Breast cyst (01/18/2023).     Physical Exam  Patient is alert and oriented x3 and in a relaxed and appropriate mood. Her gait is steady and hand grasps are equal. Sclera is clear. The breasts are nearly symmetrical. The tissue is soft without palpable  abnormalities, discrete nodules or masses. The skin and nipples appear normal. There is no cervical, supraclavicular or axillary lymphadenopathy. Heart rate and rhythm normal, S1 and S2 appreciated. The lungs are clear to auscultation bilaterally. Abdomen is soft and non-tender.       Physical Exam     Last Recorded Vitals  There were no vitals filed for this visit.    Relevant Results   Time was spent discussing digital images of the radiology testing with the patient. I explained the results in depth, along with suggested explanation for follow up recommendations based on the testing results. BI-RADS Category 1    Imaging  BI mammo bilateral screening tomosynthesis 11/08/2023    Narrative  Interpreted By:  Meghan Romero,  STUDY:  BI MAMMO BILATERAL SCREENING TOMOSYNTHESIS;  11/8/2023 2:38 pm    ACCESSION NUMBER(S):  [id number]    ORDERING CLINICIAN:  MERLENE KELLY    INDICATION:  Screening.      COMPARISON:  10/27/2022, 01/30/2018, 10/24/2016, 08/26/2015    FINDINGS:  2D and tomosynthesis images were reviewed at 1 mm slice thickness.    Density:  The breast tissue is heterogeneously dense, which may  obscure small masses.    No suspicious masses or calcifications are identified.    Impression  No mammographic evidence of malignancy.    BI-RADS CATEGORY:    BI-RADS Category:  1 Negative.  Recommendation:  Routine Screening Mammogram in 1 Year.  Recommended Date:  1 Year.  Laterality:  Bilateral.    For any future breast imaging appointments, please call [phone] ([phone]).      MACRO:  None    Signed by: Meghan Romero 11/10/2023 3:43 PM  Dictation workstation:   [id number]       Assessment/Plan   High risk surveillance care, normal clinical exam, family history of breast cancer, heterogeneously dense tissue    Plan: Return in November 2024 for bilateral screening mammogram and office visit.     Patient Discussion/Summary  Your clinical examination and imaging are normal. Please return in November 2024 for  bilateral screening mammogram and office visit or sooner if you have any problems or concerns.     High risk breast surveillance care plan:  Yearly mammogram with digital breast tomosynthesis  Twice yearly clinical breast examinations  Breast MRI  Monthly self breast examinations  Vitamin D3 2000 IU/daily (over the counter)  Exercise 3-4 times per week for 45-60 minutes  Limit alcohol to 3-4 drinks per week   Eat a healthy low-fat diet with lots of fruits and vegetables  Risk models indicate personal risk of breast cancer in the next five years and lifetime (age 90)  Breast Cancer Risk Assessment Tool (Tanisha): 5 year risk % (average %) and lifetime risk % (average %)  Kassandra-Harinder: 5 year risk % (average %) and lifetime risk % (average %)   Risk model indicates you are eligible for endocrine therapy with Tamoxifen, Raloxifene or Exemestane. Endocrine therapy reduces lifetime risk of breast cancer by 50% when taken for 5 years.  You can see your health information, review clinical summaries from office visits & test results online when you follow your health with MY  Chart, a personal health record. To sign up go to [email or web address]. If you need assistance with signing up or trouble getting into your account call [employer] Patient Line 24/7 at [phone].    My office phone number is [phone] if you need to get in touch with me or have additional questions or concerns. Thank you for choosing [facility] and trusting me as your healthcare provider. I look forward to seeing you again at your next office visit. I am honored to be a provider on your health care team and I remain dedicated to helping you achieve your health goals.      Cathryn Mcfadden, APRN-CNP

## 2024-04-17 ENCOUNTER — APPOINTMENT (OUTPATIENT)
Dept: SURGICAL ONCOLOGY | Facility: HOSPITAL | Age: 49
End: 2024-04-17
Payer: COMMERCIAL
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
## 2024-05-21 ENCOUNTER — OFFICE VISIT (OUTPATIENT)
Dept: SURGICAL ONCOLOGY | Facility: HOSPITAL | Age: 49
End: 2024-05-21
Payer: COMMERCIAL

## 2024-05-21 VITALS
SYSTOLIC BLOOD PRESSURE: 103 MMHG | DIASTOLIC BLOOD PRESSURE: 66 MMHG | BODY MASS INDEX: 26.93 KG/M2 | WEIGHT: 152 LBS | RESPIRATION RATE: 16 BRPM | HEART RATE: 74 BPM | HEIGHT: 63 IN

## 2024-05-21 DIAGNOSIS — Z12.39 BREAST CANCER SCREENING, HIGH RISK PATIENT: Primary | ICD-10-CM

## 2024-05-21 DIAGNOSIS — R92.30 DENSE BREAST TISSUE: ICD-10-CM

## 2024-05-21 PROCEDURE — 99214 OFFICE O/P EST MOD 30 MIN: CPT | Performed by: NURSE PRACTITIONER

## 2024-05-21 PROCEDURE — 1036F TOBACCO NON-USER: CPT | Performed by: NURSE PRACTITIONER

## 2024-05-21 NOTE — PATIENT INSTRUCTIONS
Your clinical examination is normal. Please return in November 2024 for bilateral screening mammogram and office visit or sooner if you have any problems or concerns.     High risk breast surveillance care plan:  Yearly mammogram with digital breast tomosynthesis  Twice yearly clinical breast examinations  Breast MRI - Fast MRI now  Monthly self breast examinations  Vitamin D3 2000 IU/daily (over the counter)  Exercise 3-4 times per week for 45-60 minutes  Limit alcohol to 3-4 drinks per week   Eat a healthy low-fat diet with lots of fruits and vegetables  Risk models indicate personal risk of breast cancer in the next five years and lifetime (age 90)  Breast Cancer Risk Assessment Tool (Tanisha): 5 year risk 1.6% (average 1.3%) and lifetime risk 14.7% (average 11.2%)  Joeyer-Vickyck: 5 year risk 2.4% (average 1.3%) and lifetime risk 17.4% (average 9.4%)   Risk model indicates you are eligible for endocrine therapy with Tamoxifen. Endocrine therapy reduces lifetime risk of breast cancer by 50% when taken for 5 years.    You can see your health information, review clinical summaries from office visits & test results online when you follow your health with MY  Chart, a personal health record. To sign up go to [email or web address]. If you need assistance with signing up or trouble getting into your account call [employer] Patient Line 24/7 at [phone].    My office phone number is [phone] if you need to get in touch with me or have additional questions or concerns. Thank you for choosing [facility] and trusting me as your healthcare provider. I look forward to seeing you again at your next office visit. I am honored to be a provider on your health care team and I remain dedicated to helping you achieve your health goals.

## 2024-05-29 ENCOUNTER — ALLIED HEALTH (OUTPATIENT)
Dept: INTEGRATIVE MEDICINE | Facility: CLINIC | Age: 49
End: 2024-05-29
Payer: COMMERCIAL

## 2024-05-29 DIAGNOSIS — F43.10 PTSD (POST-TRAUMATIC STRESS DISORDER): ICD-10-CM

## 2024-05-29 DIAGNOSIS — R63.5 WEIGHT GAIN: ICD-10-CM

## 2024-05-29 DIAGNOSIS — E03.9 HYPOTHYROIDISM, UNSPECIFIED TYPE: ICD-10-CM

## 2024-05-29 DIAGNOSIS — F43.23 ADJUSTMENT DISORDER WITH MIXED ANXIETY AND DEPRESSED MOOD: ICD-10-CM

## 2024-05-29 DIAGNOSIS — G89.29 OTHER CHRONIC PAIN: Primary | ICD-10-CM

## 2024-05-29 PROCEDURE — G2212 PROLONG OUTPT/OFFICE VIS: HCPCS | Performed by: PHYSICIAN ASSISTANT

## 2024-05-29 PROCEDURE — 99215 OFFICE O/P EST HI 40 MIN: CPT | Performed by: PHYSICIAN ASSISTANT

## 2024-05-29 NOTE — PATIENT INSTRUCTIONS
Referral to acupuncture  Complete fasting labs prior to next visit  Please send me the type and dose of magnesium you take, as well as the results to the food sensitivity testing  Lifestyle:  Drink plenty of filtered water daily (>40 oz)  Add electrolytes 3-4 times per week  Brands: LMNT, Ultima, Nuun  Homemade: [email or web address]   Use your sauna for 15-30 minutes three times weekly  Sweating promotes cortisol release  Look into rebounding/mini trampoline as discussed  [email or web address]#benefits   Add wrist/ankle weights as tolerated  Prioritize strength training for 15-20 minutes, three times weekly  Body weight- yoga, squats, push-ups, jumping jacks, etc.  Resistance- pilates, band work outs  Weight training- at the direction of a  if this is new to you.    YouTube channels:   [employer]  [employer] with Kelin Curtis  Reflect on tools and practices that have been effective in grounding you and regulating your nervous system.  How can you add these into your daily or weekly routine?  Sound baths, grounding, Epsom salt bath   Review Gut health handout and implement changes as tolerated.  Dry mouth:  Try coconut oil pulling for 5 minutes as needed for dry mouth  Use organic virgin coconut oil  [email or web address]-coconut-oil   Supplements:  Magnesium glycinate 200-300 mg, 1 hour before bed  Avoid taking with other medications/supplements at the same time.  Reduce dose if you notice loose stool.  Aloe vera- take as directed as needed for constipation or heartburn.  Solaray Weight Formula Probiotic- take 20 minutes before dinner  Topical rosemary oil- use as directed for hair health
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
## 2024-06-11 ENCOUNTER — LAB (OUTPATIENT)
Dept: LAB | Facility: LAB | Age: 49
End: 2024-06-11
Payer: COMMERCIAL

## 2024-06-11 DIAGNOSIS — E03.9 HYPOTHYROIDISM, UNSPECIFIED TYPE: ICD-10-CM

## 2024-06-11 DIAGNOSIS — R63.5 WEIGHT GAIN: ICD-10-CM

## 2024-06-11 DIAGNOSIS — F43.23 ADJUSTMENT DISORDER WITH MIXED ANXIETY AND DEPRESSED MOOD: ICD-10-CM

## 2024-06-11 DIAGNOSIS — G89.29 OTHER CHRONIC PAIN: ICD-10-CM

## 2024-06-11 DIAGNOSIS — Z00.00 HEALTHCARE MAINTENANCE: ICD-10-CM

## 2024-06-11 DIAGNOSIS — R79.89 ELEVATED LFTS: ICD-10-CM

## 2024-06-11 LAB
25(OH)D3 SERPL-MCNC: 29 NG/ML (ref 30–100)
ALBUMIN SERPL BCP-MCNC: 3.9 G/DL (ref 3.4–5)
ALP SERPL-CCNC: 54 U/L (ref 33–110)
ALT SERPL W P-5'-P-CCNC: 27 U/L (ref 7–45)
ANION GAP SERPL CALC-SCNC: 11 MMOL/L (ref 10–20)
AST SERPL W P-5'-P-CCNC: 21 U/L (ref 9–39)
BILIRUB DIRECT SERPL-MCNC: 0.1 MG/DL (ref 0–0.3)
BILIRUB SERPL-MCNC: 0.5 MG/DL (ref 0–1.2)
BUN SERPL-MCNC: 11 MG/DL (ref 6–23)
CALCIUM SERPL-MCNC: 8.6 MG/DL (ref 8.6–10.3)
CHLORIDE SERPL-SCNC: 104 MMOL/L (ref 98–107)
CHOLEST SERPL-MCNC: 288 MG/DL (ref 0–199)
CHOLESTEROL/HDL RATIO: 4.6
CO2 SERPL-SCNC: 27 MMOL/L (ref 21–32)
CREAT SERPL-MCNC: 0.67 MG/DL (ref 0.5–1.05)
EGFRCR SERPLBLD CKD-EPI 2021: >90 ML/MIN/1.73M*2
ERYTHROCYTE [DISTWIDTH] IN BLOOD BY AUTOMATED COUNT: 13.7 % (ref 11.5–14.5)
FERRITIN SERPL-MCNC: 49 NG/ML (ref 8–150)
GLUCOSE SERPL-MCNC: 88 MG/DL (ref 74–99)
HCT VFR BLD AUTO: 40.6 % (ref 36–46)
HDLC SERPL-MCNC: 62.4 MG/DL
HGB BLD-MCNC: 13 G/DL (ref 12–16)
INSULIN P FAST SERPL-ACNC: 9 UIU/ML (ref 3–25)
IRON SATN MFR SERPL: 34 % (ref 25–45)
IRON SERPL-MCNC: 121 UG/DL (ref 35–150)
LDLC SERPL CALC-MCNC: 195 MG/DL
MCH RBC QN AUTO: 30.5 PG (ref 26–34)
MCHC RBC AUTO-ENTMCNC: 32 G/DL (ref 32–36)
MCV RBC AUTO: 95 FL (ref 80–100)
NON HDL CHOLESTEROL: 226 MG/DL (ref 0–149)
NRBC BLD-RTO: 0 /100 WBCS (ref 0–0)
PLATELET # BLD AUTO: 288 X10*3/UL (ref 150–450)
POTASSIUM SERPL-SCNC: 4.5 MMOL/L (ref 3.5–5.3)
PROT SERPL-MCNC: 6.5 G/DL (ref 6.4–8.2)
RBC # BLD AUTO: 4.26 X10*6/UL (ref 4–5.2)
SODIUM SERPL-SCNC: 137 MMOL/L (ref 136–145)
T4 FREE SERPL-MCNC: 0.95 NG/DL (ref 0.61–1.12)
THYROPEROXIDASE AB SERPL-ACNC: >1000 IU/ML
TIBC SERPL-MCNC: 353 UG/DL (ref 240–445)
TRIGL SERPL-MCNC: 153 MG/DL (ref 0–149)
TSH SERPL-ACNC: 5.91 MIU/L (ref 0.44–3.98)
UIBC SERPL-MCNC: 232 UG/DL (ref 110–370)
VIT B12 SERPL-MCNC: 283 PG/ML (ref 211–911)
VLDL: 31 MG/DL (ref 0–40)
WBC # BLD AUTO: 8.5 X10*3/UL (ref 4.4–11.3)

## 2024-06-11 PROCEDURE — 82306 VITAMIN D 25 HYDROXY: CPT

## 2024-06-11 PROCEDURE — 84439 ASSAY OF FREE THYROXINE: CPT

## 2024-06-11 PROCEDURE — 83550 IRON BINDING TEST: CPT

## 2024-06-11 PROCEDURE — 85027 COMPLETE CBC AUTOMATED: CPT

## 2024-06-11 PROCEDURE — 82248 BILIRUBIN DIRECT: CPT

## 2024-06-11 PROCEDURE — 80053 COMPREHEN METABOLIC PANEL: CPT

## 2024-06-11 PROCEDURE — 36415 COLL VENOUS BLD VENIPUNCTURE: CPT

## 2024-06-11 PROCEDURE — 83525 ASSAY OF INSULIN: CPT

## 2024-06-11 PROCEDURE — 80061 LIPID PANEL: CPT

## 2024-06-11 PROCEDURE — 83540 ASSAY OF IRON: CPT

## 2024-06-11 PROCEDURE — 86376 MICROSOMAL ANTIBODY EACH: CPT

## 2024-06-11 PROCEDURE — 82728 ASSAY OF FERRITIN: CPT

## 2024-06-11 PROCEDURE — 82607 VITAMIN B-12: CPT

## 2024-06-11 PROCEDURE — 84443 ASSAY THYROID STIM HORMONE: CPT

## 2024-06-12 DIAGNOSIS — E03.9 HYPOTHYROIDISM, UNSPECIFIED TYPE: ICD-10-CM

## 2024-06-12 DIAGNOSIS — E78.5 HYPERLIPIDEMIA, UNSPECIFIED HYPERLIPIDEMIA TYPE: Primary | ICD-10-CM

## 2024-06-12 RX ORDER — LEVOTHYROXINE SODIUM 100 UG/1
100 TABLET ORAL DAILY
Qty: 90 TABLET | Refills: 1 | Status: SHIPPED | OUTPATIENT
Start: 2024-06-12 | End: 2025-06-12

## 2024-06-12 RX ORDER — ROSUVASTATIN CALCIUM 10 MG/1
10 TABLET, COATED ORAL NIGHTLY
Qty: 90 TABLET | Refills: 1 | Status: SHIPPED | OUTPATIENT
Start: 2024-06-12 | End: 2025-06-12

## 2024-06-13 ENCOUNTER — PATIENT MESSAGE (OUTPATIENT)
Dept: PRIMARY CARE | Facility: CLINIC | Age: 49
End: 2024-06-13
Payer: COMMERCIAL

## 2024-06-13 DIAGNOSIS — E03.9 HYPOTHYROIDISM, UNSPECIFIED TYPE: Primary | ICD-10-CM

## 2024-06-13 DIAGNOSIS — E06.3 HASHIMOTO'S THYROIDITIS: ICD-10-CM

## 2024-06-14 RX ORDER — LEVOTHYROXINE SODIUM 100 UG/1
100 TABLET ORAL DAILY
Qty: 90 TABLET | Refills: 1 | Status: SHIPPED | OUTPATIENT
Start: 2024-06-14 | End: 2025-06-14

## 2024-07-05 ENCOUNTER — APPOINTMENT (OUTPATIENT)
Dept: INTEGRATIVE MEDICINE | Facility: CLINIC | Age: 49
End: 2024-07-05
Payer: COMMERCIAL

## 2024-07-09 ENCOUNTER — HOSPITAL ENCOUNTER (OUTPATIENT)
Dept: RADIOLOGY | Facility: HOSPITAL | Age: 49
Discharge: HOME | End: 2024-07-09

## 2024-07-09 DIAGNOSIS — R92.30 DENSE BREAST TISSUE: ICD-10-CM

## 2024-07-09 DIAGNOSIS — Z12.39 BREAST CANCER SCREENING, HIGH RISK PATIENT: ICD-10-CM

## 2024-07-09 PROCEDURE — 6100000003 BI MR BREAST BILATERAL WITH CONTRAST FAST SCREENING SELF PAY

## 2024-07-09 PROCEDURE — A9575 INJ GADOTERATE MEGLUMI 0.1ML: HCPCS | Performed by: NURSE PRACTITIONER

## 2024-07-09 PROCEDURE — 2500000004 HC RX 250 GENERAL PHARMACY W/ HCPCS (ALT 636 FOR OP/ED): Performed by: NURSE PRACTITIONER

## 2024-07-09 RX ORDER — GADOTERATE MEGLUMINE 376.9 MG/ML
14 INJECTION INTRAVENOUS
Status: COMPLETED | OUTPATIENT
Start: 2024-07-09 | End: 2024-07-09

## 2024-07-19 ENCOUNTER — HOSPITAL ENCOUNTER (OUTPATIENT)
Dept: RADIOLOGY | Facility: HOSPITAL | Age: 49
Discharge: HOME | End: 2024-07-19
Payer: COMMERCIAL

## 2024-07-19 ENCOUNTER — LAB (OUTPATIENT)
Dept: LAB | Facility: LAB | Age: 49
End: 2024-07-19
Payer: COMMERCIAL

## 2024-07-19 DIAGNOSIS — N93.9 ABNORMAL UTERINE AND VAGINAL BLEEDING, UNSPECIFIED: ICD-10-CM

## 2024-07-19 DIAGNOSIS — N93.9 ABNORMAL UTERINE AND VAGINAL BLEEDING, UNSPECIFIED: Primary | ICD-10-CM

## 2024-07-19 LAB
B-HCG SERPL-ACNC: <2 MIU/ML
BASOPHILS # BLD AUTO: 0.03 X10*3/UL (ref 0–0.1)
BASOPHILS NFR BLD AUTO: 0.3 %
EOSINOPHIL # BLD AUTO: 0.33 X10*3/UL (ref 0–0.7)
EOSINOPHIL NFR BLD AUTO: 3.7 %
ERYTHROCYTE [DISTWIDTH] IN BLOOD BY AUTOMATED COUNT: 13.1 % (ref 11.5–14.5)
HCT VFR BLD AUTO: 36.5 % (ref 36–46)
HGB BLD-MCNC: 12 G/DL (ref 12–16)
IMM GRANULOCYTES # BLD AUTO: 0.03 X10*3/UL (ref 0–0.7)
IMM GRANULOCYTES NFR BLD AUTO: 0.3 % (ref 0–0.9)
LYMPHOCYTES # BLD AUTO: 2.55 X10*3/UL (ref 1.2–4.8)
LYMPHOCYTES NFR BLD AUTO: 28.2 %
MCH RBC QN AUTO: 30.9 PG (ref 26–34)
MCHC RBC AUTO-ENTMCNC: 32.9 G/DL (ref 32–36)
MCV RBC AUTO: 94 FL (ref 80–100)
MONOCYTES # BLD AUTO: 0.66 X10*3/UL (ref 0.1–1)
MONOCYTES NFR BLD AUTO: 7.3 %
NEUTROPHILS # BLD AUTO: 5.44 X10*3/UL (ref 1.2–7.7)
NEUTROPHILS NFR BLD AUTO: 60.2 %
NRBC BLD-RTO: 0 /100 WBCS (ref 0–0)
PLATELET # BLD AUTO: 288 X10*3/UL (ref 150–450)
RBC # BLD AUTO: 3.88 X10*6/UL (ref 4–5.2)
TSH SERPL-ACNC: 0.87 MIU/L (ref 0.44–3.98)
WBC # BLD AUTO: 9 X10*3/UL (ref 4.4–11.3)

## 2024-07-19 PROCEDURE — 84146 ASSAY OF PROLACTIN: CPT

## 2024-07-19 PROCEDURE — 83002 ASSAY OF GONADOTROPIN (LH): CPT

## 2024-07-19 PROCEDURE — 76856 US EXAM PELVIC COMPLETE: CPT | Mod: LIO

## 2024-07-19 PROCEDURE — 36415 COLL VENOUS BLD VENIPUNCTURE: CPT

## 2024-07-19 PROCEDURE — 84443 ASSAY THYROID STIM HORMONE: CPT

## 2024-07-19 PROCEDURE — 84436 ASSAY OF TOTAL THYROXINE: CPT

## 2024-07-19 PROCEDURE — 85025 COMPLETE CBC W/AUTO DIFF WBC: CPT

## 2024-07-19 PROCEDURE — 83001 ASSAY OF GONADOTROPIN (FSH): CPT

## 2024-07-19 PROCEDURE — 84702 CHORIONIC GONADOTROPIN TEST: CPT

## 2024-07-20 LAB
FSH SERPL-ACNC: 32.5 IU/L
LH SERPL-ACNC: 18.3 IU/L
PROLACTIN SERPL-MCNC: 7.8 UG/L (ref 3–20)
T4 SERPL-MCNC: 7.2 UG/DL (ref 4.5–11.1)

## 2024-09-05 ENCOUNTER — APPOINTMENT (OUTPATIENT)
Dept: DERMATOLOGY | Facility: CLINIC | Age: 49
End: 2024-09-05
Payer: COMMERCIAL

## 2024-09-06 ENCOUNTER — TELEPHONE (OUTPATIENT)
Dept: PRIMARY CARE | Facility: CLINIC | Age: 49
End: 2024-09-06
Payer: COMMERCIAL

## 2024-09-06 NOTE — TELEPHONE ENCOUNTER
Pt called and stated that she returned from [place] yesterday where she contracted chicken pox on 8/26/24.  She is currently experiencing diarrhea that has been going on for two weeks.  She took zythromax 3 days that the CDC gave her and it calmed it down but has not cleared it up.  She is exhausted, itchy the pox are not clearing up her left arm is significant.  Can she come in to the office or does she need a virtual  Please advise .

## 2024-09-06 NOTE — TELEPHONE ENCOUNTER
Pt does not have blood in her stool.  Had mucous in the beginning, not presently and had abdominal pain in the beginning also.  She believes the worst of it is over.  She will make an appt with you.

## 2024-09-10 ENCOUNTER — APPOINTMENT (OUTPATIENT)
Dept: PRIMARY CARE | Facility: CLINIC | Age: 49
End: 2024-09-10
Payer: COMMERCIAL

## 2024-09-16 ENCOUNTER — APPOINTMENT (OUTPATIENT)
Dept: PRIMARY CARE | Facility: CLINIC | Age: 49
End: 2024-09-16
Payer: COMMERCIAL

## 2024-09-16 VITALS
HEART RATE: 60 BPM | BODY MASS INDEX: 26.04 KG/M2 | SYSTOLIC BLOOD PRESSURE: 110 MMHG | OXYGEN SATURATION: 97 % | TEMPERATURE: 98 F | WEIGHT: 147 LBS | RESPIRATION RATE: 16 BRPM | DIASTOLIC BLOOD PRESSURE: 62 MMHG

## 2024-09-16 DIAGNOSIS — G47.00 INSOMNIA, UNSPECIFIED TYPE: ICD-10-CM

## 2024-09-16 DIAGNOSIS — E03.9 HYPOTHYROIDISM, UNSPECIFIED TYPE: Primary | ICD-10-CM

## 2024-09-16 DIAGNOSIS — R53.83 OTHER FATIGUE: ICD-10-CM

## 2024-09-16 DIAGNOSIS — E06.3 HASHIMOTO'S THYROIDITIS: ICD-10-CM

## 2024-09-16 DIAGNOSIS — A09 TRAVELER'S DIARRHEA: ICD-10-CM

## 2024-09-16 DIAGNOSIS — B01.9 VARICELLA WITHOUT COMPLICATION: ICD-10-CM

## 2024-09-16 DIAGNOSIS — F41.9 ANXIETY: ICD-10-CM

## 2024-09-16 PROCEDURE — 99214 OFFICE O/P EST MOD 30 MIN: CPT | Performed by: FAMILY MEDICINE

## 2024-09-16 RX ORDER — CLONAZEPAM 0.5 MG/1
0.5 TABLET ORAL 2 TIMES DAILY PRN
COMMUNITY

## 2024-09-16 RX ORDER — ARIPIPRAZOLE 5 MG/1
1 TABLET ORAL
COMMUNITY
Start: 2024-08-08

## 2024-09-16 RX ORDER — SERTRALINE HYDROCHLORIDE 100 MG/1
150 TABLET, FILM COATED ORAL DAILY
Qty: 45 TABLET | Refills: 11 | OUTPATIENT
Start: 2024-09-16 | End: 2025-09-16

## 2024-09-16 ASSESSMENT — ENCOUNTER SYMPTOMS: DIARRHEA: 1

## 2024-09-16 NOTE — PATIENT INSTRUCTIONS
Today we have followed upon your chicken pox and travelers diarrhea as well as covid fatigue.     We will also recheck your thyroid so that we can adjust medication accordingly.      Since you are a pescatarian now we will also recheck some vitamin levels especially after your travel and weight loss.

## 2024-09-16 NOTE — PROGRESS NOTES
Subjective   Patient ID: Josselyn Nielsen is a 49 y.o. female who presents for Diarrhea (Patient had recently traveled to [place]; had recent case of chicken pox) and Hypothyroidism.    She got chicken pox on her trip in [place] and was so sick with it.  It was everywhere.  She is doing so much better now    She is tired now.  She got covid end of June.  Still feels somewhat fatigued with that    She had a cancer scare - bled for 36 days straight and had EMBx - no cancer thankfully    Then after [place] she got extremely water diarrhea.  Back in [place] she has gained  9 pounds back.  She lost 20 pounds on vacation.      She had her thyroid levels checked in July would like them checked again because she keeps waking up.      She sees psych and is on abilify and zoloft  She was given a klonopin for prn use when traveling    No crestor because she doesn't want to be on another pill      Diarrhea          Review of Systems   Gastrointestinal:  Positive for diarrhea.       Objective   /62   Pulse 60   Temp 36.7 °C (98 °F)   Resp 16   Wt 66.7 kg (147 lb)   SpO2 97%   BMI 26.04 kg/m²     Physical Exam  Constitutional:       Appearance: Normal appearance.   HENT:      Head: Normocephalic.      Right Ear: Tympanic membrane normal.      Left Ear: Tympanic membrane normal.      Nose: Nose normal.      Mouth/Throat:      Mouth: Mucous membranes are moist.   Eyes:      Pupils: Pupils are equal, round, and reactive to light.   Cardiovascular:      Rate and Rhythm: Normal rate and regular rhythm.   Pulmonary:      Effort: Pulmonary effort is normal.      Breath sounds: Normal breath sounds.   Musculoskeletal:      Cervical back: Normal range of motion.   Skin:     General: Skin is warm.   Neurological:      General: No focal deficit present.      Mental Status: She is alert and oriented to person, place, and time.   Psychiatric:         Mood and Affect: Mood normal.         Assessment/Plan   Diagnoses and all orders for  this visit:  Hypothyroidism, unspecified type  -     Thyroid Stimulating Hormone; Future  -     Thyroxine, Free; Future  Hashimoto's thyroiditis  Anxiety  -     sertraline (Zoloft) 100 mg tablet; Take 1.5 tablets (150 mg) by mouth once daily.  Insomnia, unspecified type  Other fatigue  -     Vitamin D 25 hydroxy; Future  -     Vitamin B12; Future  -     CBC and Auto Differential; Future  -     Iron and TIBC; Future  Varicella without complication  Traveler's diarrhea

## 2024-09-30 ENCOUNTER — PATIENT MESSAGE (OUTPATIENT)
Dept: PRIMARY CARE | Facility: CLINIC | Age: 49
End: 2024-09-30
Payer: COMMERCIAL

## 2024-10-11 ENCOUNTER — TELEPHONE (OUTPATIENT)
Dept: PRIMARY CARE | Facility: CLINIC | Age: 49
End: 2024-10-11
Payer: COMMERCIAL

## 2024-10-11 NOTE — TELEPHONE ENCOUNTER
Office received paperwork from [employer]. Pt recently canceled a trip she had planned due to previous health concerns/issues. PCP wrote letter regarding this.     Please call pt to discuss the most recently received paperwork.

## 2024-11-01 ENCOUNTER — LAB (OUTPATIENT)
Dept: LAB | Facility: LAB | Age: 49
End: 2024-11-01
Payer: COMMERCIAL

## 2024-11-01 ENCOUNTER — APPOINTMENT (OUTPATIENT)
Dept: UROLOGY | Facility: CLINIC | Age: 49
End: 2024-11-01
Payer: COMMERCIAL

## 2024-11-01 VITALS
BODY MASS INDEX: 26.05 KG/M2 | DIASTOLIC BLOOD PRESSURE: 74 MMHG | HEART RATE: 63 BPM | WEIGHT: 147 LBS | TEMPERATURE: 97.4 F | SYSTOLIC BLOOD PRESSURE: 115 MMHG | HEIGHT: 63 IN

## 2024-11-01 DIAGNOSIS — N95.1 MENOPAUSAL SYMPTOMS: ICD-10-CM

## 2024-11-01 DIAGNOSIS — R53.83 OTHER FATIGUE: ICD-10-CM

## 2024-11-01 DIAGNOSIS — Z12.31 OTHER SCREENING MAMMOGRAM: ICD-10-CM

## 2024-11-01 DIAGNOSIS — E03.9 HYPOTHYROIDISM, UNSPECIFIED TYPE: ICD-10-CM

## 2024-11-01 LAB
BASOPHILS # BLD AUTO: 0.02 X10*3/UL (ref 0–0.1)
BASOPHILS NFR BLD AUTO: 0.3 %
EOSINOPHIL # BLD AUTO: 0.27 X10*3/UL (ref 0–0.7)
EOSINOPHIL NFR BLD AUTO: 3.7 %
ERYTHROCYTE [DISTWIDTH] IN BLOOD BY AUTOMATED COUNT: 13.5 % (ref 11.5–14.5)
HCT VFR BLD AUTO: 39.5 % (ref 36–46)
HGB BLD-MCNC: 12.8 G/DL (ref 12–16)
IMM GRANULOCYTES # BLD AUTO: 0.01 X10*3/UL (ref 0–0.7)
IMM GRANULOCYTES NFR BLD AUTO: 0.1 % (ref 0–0.9)
IRON SATN MFR SERPL: 19 % (ref 25–45)
IRON SERPL-MCNC: 64 UG/DL (ref 35–150)
LYMPHOCYTES # BLD AUTO: 2.97 X10*3/UL (ref 1.2–4.8)
LYMPHOCYTES NFR BLD AUTO: 40.6 %
MCH RBC QN AUTO: 29.5 PG (ref 26–34)
MCHC RBC AUTO-ENTMCNC: 32.4 G/DL (ref 32–36)
MCV RBC AUTO: 91 FL (ref 80–100)
MONOCYTES # BLD AUTO: 0.55 X10*3/UL (ref 0.1–1)
MONOCYTES NFR BLD AUTO: 7.5 %
NEUTROPHILS # BLD AUTO: 3.5 X10*3/UL (ref 1.2–7.7)
NEUTROPHILS NFR BLD AUTO: 47.8 %
NRBC BLD-RTO: 0 /100 WBCS (ref 0–0)
PLATELET # BLD AUTO: 288 X10*3/UL (ref 150–450)
RBC # BLD AUTO: 4.34 X10*6/UL (ref 4–5.2)
T4 FREE SERPL-MCNC: 1.14 NG/DL (ref 0.61–1.12)
TIBC SERPL-MCNC: 343 UG/DL (ref 240–445)
TSH SERPL-ACNC: 0.22 MIU/L (ref 0.44–3.98)
UIBC SERPL-MCNC: 279 UG/DL (ref 110–370)
WBC # BLD AUTO: 7.3 X10*3/UL (ref 4.4–11.3)

## 2024-11-01 PROCEDURE — 99204 OFFICE O/P NEW MOD 45 MIN: CPT | Performed by: OBSTETRICS & GYNECOLOGY

## 2024-11-01 PROCEDURE — 84443 ASSAY THYROID STIM HORMONE: CPT

## 2024-11-01 PROCEDURE — 85025 COMPLETE CBC W/AUTO DIFF WBC: CPT

## 2024-11-01 PROCEDURE — 83550 IRON BINDING TEST: CPT

## 2024-11-01 PROCEDURE — 36415 COLL VENOUS BLD VENIPUNCTURE: CPT

## 2024-11-01 PROCEDURE — 3008F BODY MASS INDEX DOCD: CPT | Performed by: OBSTETRICS & GYNECOLOGY

## 2024-11-01 PROCEDURE — 83540 ASSAY OF IRON: CPT

## 2024-11-01 PROCEDURE — 82306 VITAMIN D 25 HYDROXY: CPT

## 2024-11-01 PROCEDURE — 84439 ASSAY OF FREE THYROXINE: CPT

## 2024-11-01 PROCEDURE — 82607 VITAMIN B-12: CPT

## 2024-11-01 RX ORDER — ESTRADIOL 0.05 MG/D
1 FILM, EXTENDED RELEASE TRANSDERMAL
Qty: 24 PATCH | Refills: 1 | Status: SHIPPED | OUTPATIENT
Start: 2024-11-03 | End: 2025-11-03

## 2024-11-01 RX ORDER — PROGESTERONE 100 MG/1
100 CAPSULE ORAL DAILY
Qty: 60 CAPSULE | Refills: 3 | Status: SHIPPED | OUTPATIENT
Start: 2024-11-01 | End: 2025-06-29

## 2024-11-02 ENCOUNTER — TELEPHONE (OUTPATIENT)
Dept: PRIMARY CARE | Facility: CLINIC | Age: 49
End: 2024-11-02
Payer: COMMERCIAL

## 2024-11-02 DIAGNOSIS — E03.9 HYPOTHYROIDISM, UNSPECIFIED TYPE: Primary | ICD-10-CM

## 2024-11-02 DIAGNOSIS — E06.3 HASHIMOTO'S THYROIDITIS: ICD-10-CM

## 2024-11-02 LAB
25(OH)D3 SERPL-MCNC: 45 NG/ML (ref 30–100)
VIT B12 SERPL-MCNC: 332 PG/ML (ref 211–911)

## 2024-11-02 RX ORDER — LEVOTHYROXINE SODIUM 88 UG/1
88 TABLET ORAL 3 TIMES WEEKLY
Qty: 12 TABLET | Refills: 11 | Status: SHIPPED | OUTPATIENT
Start: 2024-11-04 | End: 2025-11-04

## 2024-11-02 RX ORDER — LEVOTHYROXINE SODIUM 100 UG/1
100 TABLET ORAL
Qty: 90 TABLET | Refills: 1 | Status: SHIPPED | OUTPATIENT
Start: 2024-11-02 | End: 2025-11-02

## 2024-11-05 DIAGNOSIS — N95.1 MENOPAUSAL SYMPTOMS: ICD-10-CM
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
## 2024-11-06 RX ORDER — ESTRADIOL 0.05 MG/D
1 FILM, EXTENDED RELEASE TRANSDERMAL 2 TIMES WEEKLY
Qty: 24 PATCH | Refills: 1 | Status: SHIPPED | OUTPATIENT
Start: 2024-11-07 | End: 2025-11-07

## 2024-11-08 ENCOUNTER — PATIENT MESSAGE (OUTPATIENT)
Dept: PRIMARY CARE | Facility: CLINIC | Age: 49
End: 2024-11-08
Payer: COMMERCIAL

## 2024-11-12 ENCOUNTER — HOSPITAL ENCOUNTER (OUTPATIENT)
Dept: RADIOLOGY | Facility: HOSPITAL | Age: 49
Discharge: HOME | End: 2024-11-12
Payer: COMMERCIAL

## 2024-11-12 ENCOUNTER — OFFICE VISIT (OUTPATIENT)
Dept: SURGICAL ONCOLOGY | Facility: HOSPITAL | Age: 49
End: 2024-11-12
Payer: COMMERCIAL

## 2024-11-12 VITALS
HEART RATE: 62 BPM | DIASTOLIC BLOOD PRESSURE: 76 MMHG | BODY MASS INDEX: 25.69 KG/M2 | SYSTOLIC BLOOD PRESSURE: 134 MMHG | WEIGHT: 145 LBS | HEIGHT: 63 IN | RESPIRATION RATE: 16 BRPM

## 2024-11-12 DIAGNOSIS — R92.30 DENSE BREAST TISSUE: Primary | ICD-10-CM

## 2024-11-12 DIAGNOSIS — Z12.39 BREAST CANCER SCREENING, HIGH RISK PATIENT: ICD-10-CM

## 2024-11-12 DIAGNOSIS — R92.8 ABNORMAL MRI, BREAST: ICD-10-CM

## 2024-11-12 PROCEDURE — 77067 SCR MAMMO BI INCL CAD: CPT | Performed by: STUDENT IN AN ORGANIZED HEALTH CARE EDUCATION/TRAINING PROGRAM

## 2024-11-12 PROCEDURE — 3008F BODY MASS INDEX DOCD: CPT | Performed by: NURSE PRACTITIONER

## 2024-11-12 PROCEDURE — 77063 BREAST TOMOSYNTHESIS BI: CPT | Performed by: STUDENT IN AN ORGANIZED HEALTH CARE EDUCATION/TRAINING PROGRAM

## 2024-11-12 PROCEDURE — 1036F TOBACCO NON-USER: CPT | Performed by: NURSE PRACTITIONER

## 2024-11-12 PROCEDURE — 99213 OFFICE O/P EST LOW 20 MIN: CPT | Performed by: NURSE PRACTITIONER

## 2024-11-12 PROCEDURE — 77067 SCR MAMMO BI INCL CAD: CPT

## 2024-11-12 NOTE — PATIENT INSTRUCTIONS
Your clinical examination and imaging are normal. Please return in one year for bilateral screening mammogram and office visit or sooner if you have any problems or concerns.     You can see your health information, review clinical summaries from office visits & test results online when you follow your health with MY  Chart, a personal health record. To sign up go to [email or web address]. If you need assistance with signing up or trouble getting into your account call [employer] Patient Line 24/7 at [phone].    My office phone number is [phone] if you need to get in touch with me or have additional questions or concerns. Thank you for choosing [facility] and trusting me as your healthcare provider. I look forward to seeing you again at your next office visit. I am honored to be a provider on your health care team and I remain dedicated to helping you achieve your health goals.

## 2024-11-26 ENCOUNTER — APPOINTMENT (OUTPATIENT)
Dept: INTEGRATIVE MEDICINE | Facility: CLINIC | Age: 49
End: 2024-11-26
Payer: COMMERCIAL

## 2024-11-26 DIAGNOSIS — G89.29 OTHER CHRONIC PAIN: Primary | ICD-10-CM

## 2024-11-26 PROCEDURE — 99202 OFFICE O/P NEW SF 15 MIN: CPT

## 2024-11-26 PROCEDURE — 97810 ACUP 1/> WO ESTIM 1ST 15 MIN: CPT

## 2024-11-26 PROCEDURE — 97811 ACUP 1/> W/O ESTIM EA ADD 15: CPT
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
## 2024-12-03 ASSESSMENT — ENCOUNTER SYMPTOMS: NECK PAIN: 1

## 2025-01-07 ENCOUNTER — HOSPITAL ENCOUNTER (OUTPATIENT)
Dept: RADIOLOGY | Facility: HOSPITAL | Age: 50
Discharge: HOME | End: 2025-01-07

## 2025-01-07 DIAGNOSIS — R92.30 DENSE BREAST TISSUE: ICD-10-CM

## 2025-01-07 DIAGNOSIS — Z12.39 BREAST CANCER SCREENING, HIGH RISK PATIENT: ICD-10-CM

## 2025-01-07 DIAGNOSIS — N63.10 MASS OF RIGHT BREAST, UNSPECIFIED QUADRANT: Primary | ICD-10-CM

## 2025-01-07 DIAGNOSIS — R92.8 ABNORMAL MRI, BREAST: ICD-10-CM

## 2025-01-07 PROCEDURE — 6100000003 BI MR BREAST BILATERAL WITH CONTRAST FAST SCREENING SELF PAY

## 2025-01-07 PROCEDURE — A9575 INJ GADOTERATE MEGLUMI 0.1ML: HCPCS | Performed by: NURSE PRACTITIONER

## 2025-01-07 PROCEDURE — 2550000001 HC RX 255 CONTRASTS: Performed by: NURSE PRACTITIONER

## 2025-01-07 RX ORDER — GADOTERATE MEGLUMINE 376.9 MG/ML
13 INJECTION INTRAVENOUS
Status: COMPLETED | OUTPATIENT
Start: 2025-01-07 | End: 2025-01-07

## 2025-01-07 RX ADMIN — GADOTERATE MEGLUMINE 13 ML: 376.9 INJECTION INTRAVENOUS at 08:22

## 2025-02-13 DIAGNOSIS — E03.9 HYPOTHYROIDISM, UNSPECIFIED TYPE: ICD-10-CM

## 2025-02-14 DIAGNOSIS — E03.9 HYPOTHYROIDISM, UNSPECIFIED TYPE: Primary | ICD-10-CM

## 2025-02-14 NOTE — TELEPHONE ENCOUNTER
Pt has scheduled for 2/18/25 and would like to get the thyroid test you want done before then, could that be placed.  There is one from a different doctor who she said she is not seeing anymore.  She has enough medicine to wait until her appointment on Tuesday.

## 2025-02-14 NOTE — PROGRESS NOTES
Current Outpatient Medications   Medication Sig Dispense Refill    ARIPiprazole (Abilify) 5 mg tablet Take 1 tablet (5 mg) by mouth early in the morning.. (Patient taking differently: Take 0.5 tablets (2.5 mg) by mouth early in the morning..)      clonazePAM (KlonoPIN) 0.5 mg tablet Take 1 tablet (0.5 mg) by mouth 2 times a day as needed for anxiety.      cyanocobalamin (Vitamin B-12) 100 mcg tablet Take 1 tablet (100 mcg) by mouth once daily.      estradiol (Vivelle-Dot) 0.05 mg/24 hr patch Place 1 patch over 96 hours on the skin 2 times a week. 24 patch 1    magnesium 30 mg tablet Take 1 tablet (30 mg) by mouth 2 times a day.      multivitamin tablet Take 1 tablet by mouth once daily.      progesterone (Prometrium) 100 mg capsule Take 1 capsule (100 mg) by mouth once daily. 60 capsule 3    sertraline (Zoloft) 100 mg tablet Take 1.5 tablets (150 mg) by mouth once daily. 45 tablet 11    Synthroid 100 mcg tablet Take 1 tablet (100 mcg) by mouth 4 times a week. Take on an empty stomach at the same time each day, either 30 to 60 minutes prior to breakfast (Patient taking differently: Take 88 mcg by mouth 4 times a week. Take on an empty stomach at the same time each day, either 30 to 60 minutes prior to breakfast) 90 tablet 1    Synthroid 88 mcg tablet Take 1 tablet (88 mcg) by mouth early in the morning.. Take on an empty stomach at the same time each day, either 30 to 60 minutes prior to breakfast 90 tablet 0     No current facility-administered medications for this visit.

## 2025-02-15 DIAGNOSIS — N95.1 MENOPAUSAL SYMPTOMS: ICD-10-CM

## 2025-02-15 LAB
T4 FREE SERPL-MCNC: 1 NG/DL (ref 0.8–1.8)
TSH SERPL-ACNC: 1.9 MIU/L

## 2025-02-18 ENCOUNTER — APPOINTMENT (OUTPATIENT)
Facility: CLINIC | Age: 50
End: 2025-02-18
Payer: COMMERCIAL

## 2025-02-18 VITALS
OXYGEN SATURATION: 96 % | BODY MASS INDEX: 27.63 KG/M2 | WEIGHT: 156 LBS | HEART RATE: 72 BPM | DIASTOLIC BLOOD PRESSURE: 74 MMHG | RESPIRATION RATE: 16 BRPM | SYSTOLIC BLOOD PRESSURE: 108 MMHG | TEMPERATURE: 97.7 F

## 2025-02-18 DIAGNOSIS — N93.9 ABNORMAL UTERINE BLEEDING: ICD-10-CM

## 2025-02-18 DIAGNOSIS — E06.3 HASHIMOTO'S THYROIDITIS: ICD-10-CM

## 2025-02-18 DIAGNOSIS — E03.9 HYPOTHYROIDISM, UNSPECIFIED TYPE: Primary | ICD-10-CM

## 2025-02-18 PROCEDURE — 99214 OFFICE O/P EST MOD 30 MIN: CPT | Performed by: FAMILY MEDICINE

## 2025-02-18 RX ORDER — LEVOTHYROXINE SODIUM 88 UG/1
88 TABLET ORAL EVERY OTHER DAY
Qty: 90 TABLET | Refills: 1 | Status: SHIPPED | OUTPATIENT
Start: 2025-02-18 | End: 2025-05-19

## 2025-02-18 RX ORDER — LEVOTHYROXINE SODIUM 100 UG/1
100 TABLET ORAL EVERY OTHER DAY
Qty: 90 TABLET | Refills: 1 | Status: SHIPPED | OUTPATIENT
Start: 2025-02-18 | End: 2026-02-18

## 2025-02-18 RX ORDER — PROGESTERONE 100 MG/1
100 CAPSULE ORAL DAILY
Qty: 90 CAPSULE | Refills: 1 | Status: SHIPPED | OUTPATIENT
Start: 2025-02-18

## 2025-02-18 RX ORDER — ARIPIPRAZOLE 2 MG/1
2 TABLET ORAL DAILY
COMMUNITY

## 2025-02-18 RX ORDER — LEVOTHYROXINE SODIUM 88 UG/1
88 TABLET ORAL DAILY
Qty: 90 TABLET | Refills: 0 | OUTPATIENT
Start: 2025-02-18

## 2025-02-18 NOTE — PROGRESS NOTES
]]  Current Outpatient Medications   Medication Sig Dispense Refill    cyanocobalamin (Vitamin B-12) 100 mcg tablet Take 1 tablet (100 mcg) by mouth once daily.      estradiol (Vivelle-Dot) 0.05 mg/24 hr patch Place 1 patch over 96 hours on the skin 2 times a week. 24 patch 1    magnesium 30 mg tablet Take 1 tablet (30 mg) by mouth 2 times a day.      multivitamin tablet Take 1 tablet by mouth once daily.      progesterone (Prometrium) 100 mg capsule Take 1 capsule (100 mg) by mouth once daily. 60 capsule 3    sertraline (Zoloft) 100 mg tablet Take 1.5 tablets (150 mg) by mouth once daily. 45 tablet 11    ARIPiprazole (Abilify) 5 mg tablet Take 1 tablet (5 mg) by mouth early in the morning.. (Patient taking differently: Take 0.5 tablets (2.5 mg) by mouth early in the morning..)      clonazePAM (KlonoPIN) 0.5 mg tablet Take 1 tablet (0.5 mg) by mouth 2 times a day as needed for anxiety. (Patient not taking: Reported on 2/18/2025)      Synthroid 100 mcg tablet Take 1 tablet (100 mcg) by mouth 4 times a week. Take on an empty stomach at the same time each day, either 30 to 60 minutes prior to breakfast (Patient taking differently: Take 88 mcg by mouth 4 times a week. Take on an empty stomach at the same time each day, either 30 to 60 minutes prior to breakfast) 90 tablet 1    Synthroid 88 mcg tablet Take 1 tablet (88 mcg) by mouth early in the morning.. Take on an empty stomach at the same time each day, either 30 to 60 minutes prior to breakfast 90 tablet 0     No current facility-administered medications for this visit.   Subjective   Patient ID: Josselyn Nielsen is a 49 y.o. female who presents for Hypothyroidism.    She is on the estrogen and progesterone from gyn - she is feeling better.  She hadn't gotten a period for 11.5 months and then she bled for 2 months straight.  She has proliferative endometrium.  She is getting a period even being on the estrogen patch and the oral progesterone    She saw an integrative  medicine doctor.   She felt good after that.     She has hashimoto's and she still feels like either thyroid or her hormones are off.           Review of Systems    Objective   /74   Pulse 72   Temp 36.5 °C (97.7 °F)   Resp 16   Wt 70.8 kg (156 lb)   SpO2 96%   BMI 27.63 kg/m²     Physical Exam  Constitutional:       Appearance: Normal appearance.   HENT:      Head: Normocephalic and atraumatic.   Neck:      Thyroid: No thyroid mass, thyromegaly or thyroid tenderness.   Cardiovascular:      Rate and Rhythm: Normal rate and regular rhythm.   Pulmonary:      Effort: Pulmonary effort is normal.      Breath sounds: Normal breath sounds.   Musculoskeletal:      Cervical back: Normal range of motion and neck supple.   Skin:     General: Skin is warm and dry.   Neurological:      Mental Status: She is alert.         Assessment/Plan   Diagnoses and all orders for this visit:  Hypothyroidism, unspecified type  -     Synthroid 100 mcg tablet; Take 1 tablet (100 mcg) by mouth every other day. Take on an empty stomach at the same time each day, either 30 to 60 minutes prior to breakfast.  Rotate every other day with the 88mcg  -     Synthroid 88 mcg tablet; Take 1 tablet (88 mcg) by mouth every other day. Take on an empty stomach at the same time each day, either 30 to 60 minutes prior to breakfast  Abnormal uterine bleeding  Hashimoto's thyroiditis  -     Synthroid 100 mcg tablet; Take 1 tablet (100 mcg) by mouth every other day. Take on an empty stomach at the same time each day, either 30 to 60 minutes prior to breakfast.  Rotate every other day with the 88mcg

## 2025-02-18 NOTE — PATIENT INSTRUCTIONS
Today we followed up on your hashimoto's thyroid. Your numbers are great. Continue alternating dosages.      You will see your gyn for HRT and psychiatrist for anxiety.  These are both stable and will help balance as you go through menopause    Please make an appointment to follow up for your Annual Physical.

## 2025-03-07 ENCOUNTER — APPOINTMENT (OUTPATIENT)
Dept: UROLOGY | Facility: CLINIC | Age: 50
End: 2025-03-07
Payer: COMMERCIAL

## 2025-04-01 ENCOUNTER — APPOINTMENT (OUTPATIENT)
Facility: CLINIC | Age: 50
End: 2025-04-01
Payer: COMMERCIAL

## 2025-04-01 VITALS
OXYGEN SATURATION: 98 % | RESPIRATION RATE: 16 BRPM | HEIGHT: 63 IN | DIASTOLIC BLOOD PRESSURE: 72 MMHG | HEART RATE: 72 BPM | SYSTOLIC BLOOD PRESSURE: 110 MMHG | WEIGHT: 162 LBS | BODY MASS INDEX: 28.7 KG/M2 | TEMPERATURE: 98.2 F

## 2025-04-01 DIAGNOSIS — R82.90 ABNORMAL URINALYSIS: ICD-10-CM

## 2025-04-01 DIAGNOSIS — E55.9 HYPOVITAMINOSIS D: ICD-10-CM

## 2025-04-01 DIAGNOSIS — E06.3 HASHIMOTO'S THYROIDITIS: ICD-10-CM

## 2025-04-01 DIAGNOSIS — Z00.00 HEALTHCARE MAINTENANCE: Primary | ICD-10-CM

## 2025-04-01 DIAGNOSIS — Z12.11 SCREENING FOR MALIGNANT NEOPLASM OF COLON: ICD-10-CM

## 2025-04-01 DIAGNOSIS — R73.03 PREDIABETES: ICD-10-CM

## 2025-04-01 PROBLEM — Z79.890 HORMONE REPLACEMENT THERAPY (HRT): Status: ACTIVE | Noted: 2025-04-01

## 2025-04-01 PROBLEM — F31.62 BIPOLAR DISORDER, CURRENT EPISODE MIXED, MODERATE (MULTI): Status: RESOLVED | Noted: 2021-04-16 | Resolved: 2025-04-01

## 2025-04-01 LAB
POC APPEARANCE, URINE: CLEAR
POC BILIRUBIN, URINE: NEGATIVE
POC BLOOD, URINE: ABNORMAL
POC COLOR, URINE: YELLOW
POC GLUCOSE, URINE: NEGATIVE MG/DL
POC KETONES, URINE: NEGATIVE MG/DL
POC LEUKOCYTES, URINE: NEGATIVE
POC NITRITE,URINE: NEGATIVE
POC PH, URINE: 5.5 PH
POC PROTEIN, URINE: NEGATIVE MG/DL
POC SPECIFIC GRAVITY, URINE: >=1.03
POC UROBILINOGEN, URINE: 0.2 EU/DL

## 2025-04-01 PROCEDURE — 93000 ELECTROCARDIOGRAM COMPLETE: CPT | Performed by: FAMILY MEDICINE

## 2025-04-01 PROCEDURE — G8433 SCR FOR DEP NOT CPT DOC RSN: HCPCS | Performed by: FAMILY MEDICINE

## 2025-04-01 PROCEDURE — 99396 PREV VISIT EST AGE 40-64: CPT | Performed by: FAMILY MEDICINE

## 2025-04-01 PROCEDURE — 1036F TOBACCO NON-USER: CPT | Performed by: FAMILY MEDICINE

## 2025-04-01 PROCEDURE — 3008F BODY MASS INDEX DOCD: CPT | Performed by: FAMILY MEDICINE

## 2025-04-01 PROCEDURE — 81002 URINALYSIS NONAUTO W/O SCOPE: CPT | Performed by: FAMILY MEDICINE

## 2025-04-01 RX ORDER — METHYLPREDNISOLONE 4 MG/1
TABLET ORAL
COMMUNITY

## 2025-04-01 RX ORDER — PROGESTERONE 100 MG/1
100 CAPSULE ORAL DAILY
COMMUNITY

## 2025-04-01 RX ORDER — TESTOSTERONE 25 MG
PELLET (EA) IMPLANTATION
COMMUNITY

## 2025-04-01 RX ORDER — ESTRADIOL 6 MG
PELLET (EA) IMPLANTATION
COMMUNITY

## 2025-04-01 NOTE — PATIENT INSTRUCTIONS
Today we performed your Annual Physical Exam.  Your preventative health care, labs and vaccinations have been reviewed and are up to date.     RTC for shingles vaccine when ready    Follow up in 6 months for a medication check for thyroid    Follow up in 1 year for your Complete Physical Exam

## 2025-04-01 NOTE — PROGRESS NOTES
Subjective   Patient ID: Josselyn Nielsen is a 50 y.o. female who presents for Annual Exam.    Dentist and Eye Doctor appointments: UTD   Immunizations: wishes to postpone shingles vaccine till next visit, refuses flu and covid   Diet: healthy- mediterranean diet   Exercise: 3-4x a week, pilates, yoga, walking    Supplements: multivitamin, probiotic, vitamin D, Mg    Menstrual Cycles: irregular, LMP 3/24 after 6 mo no menses    Sexually Active: yes, no STD concerns   Pregnancy History:    Cancer Screening:    Cervical: Dr. Quintana -[facility]. Reports having normal pap last year.        Breast: UTD   Colon: Due,  (Cologuard negative 10/2022, yes Fhx)     Skin: wears SPF    DEXA: N/A     Pt c/o perimenopausal symptoms. She started HRT February, which has helped with sleep disturbances (still wakes up 1-2x a night), fatigue, and mood. She also c/o weight gain. She eats healthy and exercise, but has had steady weight gain since starting Zoloft and Abilify 4 years ago. She was diagnosed with hashimoto thyroiditis over the past year.      On zithromax and steroid for her sinuses    Review of Systems   All other systems reviewed and are negative.      Current Outpatient Medications:     ARIPiprazole (Abilify) 2 mg tablet, Take 1 tablet (2 mg) by mouth once daily., Disp: , Rfl:     cyanocobalamin (Vitamin B-12) 100 mcg tablet, Take 1 tablet (100 mcg) by mouth once daily., Disp: , Rfl:     magnesium 30 mg tablet, Take 1 tablet (30 mg) by mouth 2 times a day., Disp: , Rfl:     multivitamin tablet, Take 1 tablet by mouth once daily., Disp: , Rfl:     sertraline (Zoloft) 100 mg tablet, Take 1.5 tablets (150 mg) by mouth once daily., Disp: 45 tablet, Rfl: 11    Synthroid 100 mcg tablet, Take 1 tablet (100 mcg) by mouth every other day. Take on an empty stomach at the same time each day, either 30 to 60 minutes prior to breakfast.  Rotate every other day with the 88g, Disp: 90 tablet, Rfl: 1    Synthroid 88 mcg  "tablet, Take 1 tablet (88 mcg) by mouth every other day. Take on an empty stomach at the same time each day, either 30 to 60 minutes prior to breakfast, Disp: 90 tablet, Rfl: 1    estradioL 6 mg pellet, by implant route., Disp: , Rfl:     methylPREDNISolone (Medrol Dospak) 4 mg tablets, Take by mouth. Follow schedule on package instructions, Disp: , Rfl:     progesterone (Prometrium) 100 mg capsule, Take 1 capsule (100 mg) by mouth once daily., Disp: , Rfl:     testosterone 25 mg pellet, by implant route., Disp: , Rfl:     Allergies   Allergen Reactions    Cymbalta [Duloxetine] Other    Nitrofurantoin Monohyd/M-Cryst Hives    Norepinephrine Unknown    Penicillins Unknown       Past Medical History:   Diagnosis Date    Breast cyst 01/18/2023    Hashimoto thyroiditis        Social History     Tobacco Use    Smoking status: Never    Smokeless tobacco: Never   Vaping Use    Vaping status: Never Used   Substance Use Topics    Alcohol use: Yes     Comment: rare    Drug use: Not Currently       Objective     Vitals:    04/01/25 1254   BP: 110/72   Pulse: 72   Resp: 16   Temp: 36.8 °C (98.2 °F)   SpO2: 98%   Weight: 73.5 kg (162 lb)   Height: 1.6 m (5' 3\")     Patient's last menstrual period was 03/25/2025.    Physical Exam  Constitutional:       Appearance: Normal appearance.   HENT:      Head: Normocephalic and atraumatic.      Right Ear: Tympanic membrane normal.      Left Ear: Tympanic membrane normal.      Nose: Nose normal.      Mouth/Throat:      Mouth: Mucous membranes are moist.      Pharynx: Oropharynx is clear.   Eyes:      Extraocular Movements: Extraocular movements intact.      Conjunctiva/sclera: Conjunctivae normal.      Pupils: Pupils are equal, round, and reactive to light.   Cardiovascular:      Rate and Rhythm: Normal rate and regular rhythm.      Heart sounds: No murmur heard.     No friction rub. No gallop.   Pulmonary:      Effort: Pulmonary effort is normal. No respiratory distress.      Breath " sounds: Normal breath sounds. No stridor. No wheezing or rales.   Abdominal:      General: Abdomen is flat. Bowel sounds are normal. There is no distension.      Palpations: Abdomen is soft. There is no mass.      Tenderness: There is no abdominal tenderness.   Musculoskeletal:      Cervical back: Neck supple. No tenderness.   Skin:     General: Skin is warm and dry.   Neurological:      General: No focal deficit present.      Mental Status: She is alert and oriented to person, place, and time.   Psychiatric:         Mood and Affect: Mood normal.         Behavior: Behavior normal.         Thought Content: Thought content normal.         Judgment: Judgment normal.       Assessment/Plan   Diagnoses and all orders for this visit:  Healthcare maintenance  -     POCT UA (nonautomated) manually resulted  -     ECG 12 Lead  -     CBC; Future  -     Comprehensive Metabolic Panel; Future  -     Lipid Panel; Future  Screening for malignant neoplasm of colon  -     Colonoscopy Screening; High Risk Patient; Mom with Colon Cancer; Future  Abnormal urinalysis  -     Urine Culture; Future  Hashimoto's thyroiditis  -     Thyroxine, Free; Future  -     Thyroid Stimulating Hormone; Future  Hypovitaminosis D  -     Vitamin D 25 hydroxy; Future  Prediabetes  -     Hemoglobin A1c; Future

## 2025-04-01 NOTE — PROGRESS NOTES
Subjective   Patient ID: Josselyn Nielsen is a 50 y.o. female who presents for Annual Exam.    Dentist and Eye Doctor appointments:   Immunizations:   Diet:   Exercise:   Supplements:   Menstrual Cycles:  Sexually Active:  Pregnancy History:  Cancer Screening:    Cervical:    Breast:   Colon:    Skin:   DEXA:        HPI    Review of Systems      Current Outpatient Medications:     ARIPiprazole (Abilify) 2 mg tablet, Take 1 tablet (2 mg) by mouth once daily., Disp: , Rfl:     cyanocobalamin (Vitamin B-12) 100 mcg tablet, Take 1 tablet (100 mcg) by mouth once daily., Disp: , Rfl:     estradiol (Vivelle-Dot) 0.05 mg/24 hr patch, Place 1 patch over 96 hours on the skin 2 times a week., Disp: 24 patch, Rfl: 1    magnesium 30 mg tablet, Take 1 tablet (30 mg) by mouth 2 times a day., Disp: , Rfl:     multivitamin tablet, Take 1 tablet by mouth once daily., Disp: , Rfl:     progesterone (Prometrium) 100 mg capsule, TAKE 1 CAPSULE BY MOUTH ONCE DAILY., Disp: 90 capsule, Rfl: 1    sertraline (Zoloft) 100 mg tablet, Take 1.5 tablets (150 mg) by mouth once daily., Disp: 45 tablet, Rfl: 11    Synthroid 100 mcg tablet, Take 1 tablet (100 mcg) by mouth every other day. Take on an empty stomach at the same time each day, either 30 to 60 minutes prior to breakfast.  Rotate every other day with the 88mcg, Disp: 90 tablet, Rfl: 1    Synthroid 88 mcg tablet, Take 1 tablet (88 mcg) by mouth every other day. Take on an empty stomach at the same time each day, either 30 to 60 minutes prior to breakfast, Disp: 90 tablet, Rfl: 1    Allergies   Allergen Reactions    Cymbalta [Duloxetine] Other    Nitrofurantoin Monohyd/M-Cryst Hives    Norepinephrine Unknown    Penicillins Unknown       Past Medical History:   Diagnosis Date    Anesthesia of skin 09/03/2020    Numbness and tingling    Breast cyst 01/18/2023       Social History     Tobacco Use    Smoking status: Never    Smokeless tobacco: Never   Vaping Use    Vaping status: Never Used  "  Substance Use Topics    Alcohol use: Yes     Comment: rare    Drug use: Not Currently       Objective     Vitals:    04/01/25 1254   BP: 110/72   Pulse: 72   Resp: 16   Temp: 36.8 °C (98.2 °F)   SpO2: 98%   Weight: 73.5 kg (162 lb)   Height: 1.6 m (5' 3\")     Patient's last menstrual period was 03/25/2025.    Physical Exam    Assessment/Plan   Diagnoses and all orders for this visit:  Healthcare maintenance  -     POCT UA (nonautomated) manually resulted            "

## 2025-04-03 ENCOUNTER — TELEPHONE (OUTPATIENT)
Facility: CLINIC | Age: 50
End: 2025-04-03
Payer: COMMERCIAL

## 2025-04-03 DIAGNOSIS — R82.90 ABNORMAL URINALYSIS: Primary | ICD-10-CM

## 2025-04-03 DIAGNOSIS — Z12.11 COLON CANCER SCREENING: ICD-10-CM

## 2025-04-03 LAB — BACTERIA UR CULT: NORMAL

## 2025-04-07 RX ORDER — SODIUM, POTASSIUM,MAG SULFATES 17.5-3.13G
1 SOLUTION, RECONSTITUTED, ORAL ORAL EVERY 12 HOURS
Qty: 2 EACH | Refills: 0 | Status: SHIPPED | OUTPATIENT
Start: 2025-04-07

## 2025-04-17 ENCOUNTER — TELEPHONE (OUTPATIENT)
Facility: CLINIC | Age: 50
End: 2025-04-17
Payer: COMMERCIAL

## 2025-04-17 DIAGNOSIS — E78.5 HYPERLIPIDEMIA, UNSPECIFIED HYPERLIPIDEMIA TYPE: Primary | ICD-10-CM

## 2025-04-17 LAB
25(OH)D3+25(OH)D2 SERPL-MCNC: 26 NG/ML (ref 30–100)
ALBUMIN SERPL-MCNC: 4.3 G/DL (ref 3.6–5.1)
ALP SERPL-CCNC: 62 U/L (ref 37–153)
ALT SERPL-CCNC: 28 U/L (ref 6–29)
ANION GAP SERPL CALCULATED.4IONS-SCNC: 7 MMOL/L (CALC) (ref 7–17)
APPEARANCE UR: ABNORMAL
AST SERPL-CCNC: 20 U/L (ref 10–35)
BACTERIA #/AREA URNS HPF: ABNORMAL /HPF
BACTERIA UR CULT: ABNORMAL
BILIRUB SERPL-MCNC: 0.6 MG/DL (ref 0.2–1.2)
BILIRUB UR QL STRIP: NEGATIVE
BUN SERPL-MCNC: 11 MG/DL (ref 7–25)
CALCIUM SERPL-MCNC: 9 MG/DL (ref 8.6–10.4)
CHLORIDE SERPL-SCNC: 102 MMOL/L (ref 98–110)
CHOLEST SERPL-MCNC: 325 MG/DL
CHOLEST/HDLC SERPL: 5.9 (CALC)
CO2 SERPL-SCNC: 29 MMOL/L (ref 20–32)
COLOR UR: YELLOW
CREAT SERPL-MCNC: 0.76 MG/DL (ref 0.5–1.03)
EGFRCR SERPLBLD CKD-EPI 2021: 95 ML/MIN/1.73M2
ERYTHROCYTE [DISTWIDTH] IN BLOOD BY AUTOMATED COUNT: 13 % (ref 11–15)
EST. AVERAGE GLUCOSE BLD GHB EST-MCNC: 108 MG/DL
EST. AVERAGE GLUCOSE BLD GHB EST-SCNC: 6 MMOL/L
GLUCOSE SERPL-MCNC: 96 MG/DL (ref 65–99)
GLUCOSE UR QL STRIP: NEGATIVE
HBA1C MFR BLD: 5.4 %
HCT VFR BLD AUTO: 41.7 % (ref 35–45)
HDLC SERPL-MCNC: 55 MG/DL
HGB BLD-MCNC: 13.9 G/DL (ref 11.7–15.5)
HGB UR QL STRIP: NEGATIVE
HYALINE CASTS #/AREA URNS LPF: ABNORMAL /LPF
KETONES UR QL STRIP: NEGATIVE
LDLC SERPL CALC-MCNC: 233 MG/DL (CALC)
LEUKOCYTE ESTERASE UR QL STRIP: ABNORMAL
MCH RBC QN AUTO: 31.8 PG (ref 27–33)
MCHC RBC AUTO-ENTMCNC: 33.3 G/DL (ref 32–36)
MCV RBC AUTO: 95.4 FL (ref 80–100)
NITRITE UR QL STRIP: NEGATIVE
NONHDLC SERPL-MCNC: 270 MG/DL (CALC)
PH UR STRIP: 8 [PH] (ref 5–8)
PLATELET # BLD AUTO: 280 THOUSAND/UL (ref 140–400)
PMV BLD REES-ECKER: 10.7 FL (ref 7.5–12.5)
POTASSIUM SERPL-SCNC: 4.4 MMOL/L (ref 3.5–5.3)
PROT SERPL-MCNC: 7 G/DL (ref 6.1–8.1)
PROT UR QL STRIP: ABNORMAL
RBC # BLD AUTO: 4.37 MILLION/UL (ref 3.8–5.1)
RBC #/AREA URNS HPF: ABNORMAL /HPF
SERVICE CMNT-IMP: ABNORMAL
SODIUM SERPL-SCNC: 138 MMOL/L (ref 135–146)
SP GR UR STRIP: 1.02 (ref 1–1.03)
SQUAMOUS #/AREA URNS HPF: ABNORMAL /HPF
T4 FREE SERPL-MCNC: 1.2 NG/DL (ref 0.8–1.8)
TRIGL SERPL-MCNC: 194 MG/DL
TSH SERPL-ACNC: 2.66 MIU/L
WBC # BLD AUTO: 7.8 THOUSAND/UL (ref 3.8–10.8)
WBC #/AREA URNS HPF: ABNORMAL /HPF

## 2025-04-17 RX ORDER — ROSUVASTATIN CALCIUM 5 MG/1
5 TABLET, COATED ORAL NIGHTLY
Qty: 90 TABLET | Refills: 3 | Status: SHIPPED | OUTPATIENT
Start: 2025-04-17 | End: 2026-04-17

## 2025-05-23 ENCOUNTER — ANESTHESIA EVENT (OUTPATIENT)
Dept: GASTROENTEROLOGY | Facility: EXTERNAL LOCATION | Age: 50
End: 2025-05-23

## 2025-05-29 PROBLEM — G25.81 RESTLESS LEGS SYNDROME: Status: ACTIVE | Noted: 2025-05-29

## 2025-05-29 PROBLEM — R20.2 NUMBNESS AND TINGLING SENSATION OF SKIN: Status: ACTIVE | Noted: 2025-05-29

## 2025-05-29 PROBLEM — R20.0 NUMBNESS AND TINGLING SENSATION OF SKIN: Status: ACTIVE | Noted: 2025-05-29

## 2025-06-05 ENCOUNTER — ANESTHESIA (OUTPATIENT)
Dept: GASTROENTEROLOGY | Facility: EXTERNAL LOCATION | Age: 50
End: 2025-06-05

## 2025-06-05 ENCOUNTER — APPOINTMENT (OUTPATIENT)
Dept: GASTROENTEROLOGY | Facility: EXTERNAL LOCATION | Age: 50
End: 2025-06-05
Payer: COMMERCIAL

## 2025-07-17 ENCOUNTER — APPOINTMENT (OUTPATIENT)
Dept: GASTROENTEROLOGY | Facility: EXTERNAL LOCATION | Age: 50
End: 2025-07-17
Payer: COMMERCIAL

## 2025-07-17 VITALS
HEIGHT: 63 IN | BODY MASS INDEX: 25.69 KG/M2 | TEMPERATURE: 98.1 F | RESPIRATION RATE: 11 BRPM | WEIGHT: 145 LBS | SYSTOLIC BLOOD PRESSURE: 95 MMHG | DIASTOLIC BLOOD PRESSURE: 59 MMHG | HEART RATE: 65 BPM | OXYGEN SATURATION: 98 %

## 2025-07-17 DIAGNOSIS — Z12.11 SCREENING FOR MALIGNANT NEOPLASM OF COLON: ICD-10-CM

## 2025-07-17 LAB — PREGNANCY TEST URINE, POC: NEGATIVE

## 2025-07-17 PROCEDURE — 45385 COLONOSCOPY W/LESION REMOVAL: CPT | Performed by: INTERNAL MEDICINE

## 2025-07-17 PROCEDURE — 81025 URINE PREGNANCY TEST: CPT | Performed by: INTERNAL MEDICINE

## 2025-07-17 RX ORDER — ONDANSETRON HYDROCHLORIDE 2 MG/ML
4 INJECTION, SOLUTION INTRAVENOUS ONCE AS NEEDED
Status: DISCONTINUED | OUTPATIENT
Start: 2025-07-17 | End: 2025-07-18 | Stop reason: HOSPADM

## 2025-07-17 RX ORDER — PROPOFOL 10 MG/ML
INJECTION, EMULSION INTRAVENOUS AS NEEDED
Status: DISCONTINUED | OUTPATIENT
Start: 2025-07-17 | End: 2025-07-17

## 2025-07-17 RX ORDER — SODIUM CHLORIDE 9 MG/ML
INJECTION, SOLUTION INTRAVENOUS CONTINUOUS PRN
Status: DISCONTINUED | OUTPATIENT
Start: 2025-07-17 | End: 2025-07-17

## 2025-07-17 RX ORDER — LIDOCAINE HYDROCHLORIDE 20 MG/ML
INJECTION, SOLUTION INFILTRATION; PERINEURAL AS NEEDED
Status: DISCONTINUED | OUTPATIENT
Start: 2025-07-17 | End: 2025-07-17

## 2025-07-17 RX ORDER — SERTRALINE HYDROCHLORIDE 25 MG/1
TABLET, FILM COATED ORAL
COMMUNITY
Start: 2025-07-16

## 2025-07-17 RX ADMIN — SODIUM CHLORIDE: 9 INJECTION, SOLUTION INTRAVENOUS at 11:14

## 2025-07-17 RX ADMIN — PROPOFOL 50 MG: 10 INJECTION, EMULSION INTRAVENOUS at 11:15

## 2025-07-17 RX ADMIN — PROPOFOL 50 MG: 10 INJECTION, EMULSION INTRAVENOUS at 11:21

## 2025-07-17 RX ADMIN — PROPOFOL 50 MG: 10 INJECTION, EMULSION INTRAVENOUS at 11:25

## 2025-07-17 RX ADMIN — PROPOFOL 100 MG: 10 INJECTION, EMULSION INTRAVENOUS at 11:14

## 2025-07-17 RX ADMIN — PROPOFOL 50 MG: 10 INJECTION, EMULSION INTRAVENOUS at 11:17

## 2025-07-17 RX ADMIN — PROPOFOL 50 MG: 10 INJECTION, EMULSION INTRAVENOUS at 11:23

## 2025-07-17 RX ADMIN — PROPOFOL 50 MG: 10 INJECTION, EMULSION INTRAVENOUS at 11:19

## 2025-07-17 RX ADMIN — LIDOCAINE HYDROCHLORIDE 2.5 ML: 20 INJECTION, SOLUTION INFILTRATION; PERINEURAL at 11:14

## 2025-07-17 SDOH — HEALTH STABILITY: MENTAL HEALTH: CURRENT SMOKER: 0

## 2025-07-17 ASSESSMENT — PAIN - FUNCTIONAL ASSESSMENT
PAIN_FUNCTIONAL_ASSESSMENT: 0-10

## 2025-07-17 ASSESSMENT — PAIN SCALES - GENERAL
PAINLEVEL_OUTOF10: 0 - NO PAIN
PAIN_LEVEL: 0

## 2025-07-17 ASSESSMENT — COLUMBIA-SUICIDE SEVERITY RATING SCALE - C-SSRS
2. HAVE YOU ACTUALLY HAD ANY THOUGHTS OF KILLING YOURSELF?: NO
1. IN THE PAST MONTH, HAVE YOU WISHED YOU WERE DEAD OR WISHED YOU COULD GO TO SLEEP AND NOT WAKE UP?: NO
6. HAVE YOU EVER DONE ANYTHING, STARTED TO DO ANYTHING, OR PREPARED TO DO ANYTHING TO END YOUR LIFE?: NO

## 2025-07-17 NOTE — ANESTHESIA POSTPROCEDURE EVALUATION
Patient: Josselyn Nielsen    Procedure Summary       Date: 07/17/25 Room / Location: [facility]    Anesthesia Start: 1112 Anesthesia Stop:     Procedure: COLONOSCOPY Diagnosis: Screening for malignant neoplasm of colon    Scheduled Providers: Naz MYERS MD Responsible Provider: BOOGIE Cody    Anesthesia Type: MAC ASA Status: 2            Anesthesia Type: MAC    Vitals Value Taken Time   BP 86/51 07/17/25 11:29   Temp 36.7 °C (98.1 °F) 07/17/25 11:29   Pulse 66 07/17/25 11:29   Resp 14 07/17/25 11:29   SpO2 95 % 07/17/25 11:29       Anesthesia Post Evaluation    Patient location during evaluation: bedside  Patient participation: complete - patient participated  Level of consciousness: sedated  Pain score: 0  Pain management: adequate  Airway patency: patent  Cardiovascular status: acceptable  Respiratory status: acceptable  Hydration status: acceptable  Postoperative Nausea and Vomiting: none        No notable events documented.

## 2025-07-17 NOTE — H&P
Outpatient Hospital Procedure    Patient Profile-Procedures  Initial Info  Patient Demographics  Name Josselyn Nielsen  Date of Birth 1975  MRN [id number]  Address   [address] [id number] [place] [place] [address]    Primary Phone Number [phone]  Secondary Phone Number    PCP Haase, Kristen Michelle    Procedures   Colonoscopy      Indication:  Screening - mom with CRC age 79    Anesthesia Indication: anticipated intolerance to moderate sedation    Primary contact name and number   Extended Emergency Contact Information  Primary Emergency Contact: Paddy Nielsen  Address: [address] [place] [address] [place] [facility]  Home Phone: [phone]  Work Phone: [phone]  Mobile Phone: [phone]  Relation: Spouse    General Health  Weight   Vitals:    07/17/25 1021   Weight: 65.8 kg (145 lb)     BMI Body mass index is 25.69 kg/m².    Allergies  RX Allergies[1]    Past Medical History   Medical History[2]    Provider assessment  Diagnosis  Medication Reviewed - yes  Prior to Admission medications   Medication Sig Start Date End Date Taking? Authorizing Provider   cyanocobalamin (Vitamin B-12) 100 mcg tablet Take 1 tablet (100 mcg) by mouth once daily.   Yes Historical Provider, MD   estradioL 6 mg pellet by implant route.   Yes Historical Provider, MD   magnesium 30 mg tablet Take 1 tablet (30 mg) by mouth 2 times a day.   Yes Historical Provider, MD   multivitamin tablet Take 1 tablet by mouth once daily.   Yes Historical Provider, MD   progesterone (Prometrium) 100 mg capsule Take 1 capsule (100 mg) by mouth once daily.   Yes Historical Provider, MD   semaglutide, weight loss, (WEGOVY) 0.25 mg/0.5 mL pen injector Inject 0.25 mg under the skin every 7 days.   Yes Historical Provider, MD   sertraline (Zoloft) 100 mg tablet Take 1.5 tablets (150 mg) by mouth once daily. 9/16/24 9/16/25 Yes Kati Alexandra,    sertraline (Zoloft) 25 mg tablet  7/16/25   Yes Historical Provider, MD   Synthroid 88 mcg tablet Take 1 tablet (88 mcg) by mouth every other day. Take on an empty stomach at the same time each day, either 30 to 60 minutes prior to breakfast 2/18/25 7/17/25 Yes Kati Alexandra,    testosterone 25 mg pellet by implant route.   Yes Historical Provider, MD   sodium,potassium,mag sulfates (Suprep Bowel Prep Kit) 17.5-3.13-1.6 gram solution Take 1 bottle by mouth every 12 hours. 4/7/25 7/17/25 Yes Naz MYERS MD   ARIPiprazole (Abilify) 2 mg tablet Take 1 tablet (2 mg) by mouth once daily.    Historical Provider, MD   methylPREDNISolone (Medrol Dospak) 4 mg tablets Take by mouth. Follow schedule on package instructions    Historical Provider, MD   rosuvastatin (Crestor) 5 mg tablet Take 1 tablet (5 mg) by mouth once daily at bedtime. 4/17/25 4/17/26  Kati Alexandra DO   Synthroid 100 mcg tablet Take 1 tablet (100 mcg) by mouth every other day. Take on an empty stomach at the same time each day, either 30 to 60 minutes prior to breakfast.  Rotate every other day with the 88mcg 2/18/25 2/18/26  Kati Alexandra DO       This is my H&P    Physical Exam  Physical Exam  Constitutional:       Comments: Awake   HENT:      Head: Normocephalic.     Cardiovascular:      Rate and Rhythm: Normal rate and regular rhythm.   Pulmonary:      Effort: Pulmonary effort is normal.      Breath sounds: Normal breath sounds.   Abdominal:      General: Bowel sounds are normal.      Palpations: Abdomen is soft.     Neurological:      Mental Status: She is alert.     Psychiatric:         Mood and Affect: Mood normal.           Oropharyngeal Classification II (hard and soft palate, upper portion of tonsils and uvula visible)  ASA PS Classification 2  Sedation Plan Deep  Procedure Plan - pre-procedural (re)assesment completed by physician:  discharge/transfer patient when discharge criteria met    Naz Andrew MD  7/17/2025 11:12 AM           [1]   Allergies  Allergen  Reactions    Cymbalta [Duloxetine] Other    Nitrofurantoin Monohyd/M-Cryst Hives    Norepinephrine Unknown    Penicillins Unknown   [2]   Past Medical History:  Diagnosis Date    Anxiety     Breast cyst 01/18/2023    Depression     Hashimoto thyroiditis     Hyperlipidemia

## 2025-07-17 NOTE — ANESTHESIA PREPROCEDURE EVALUATION
Patient: Josselyn Nielsen    Procedure Information       Date/Time: 07/17/25 1040    Scheduled providers: Naz MYERS MD    Procedure: COLONOSCOPY    Location: [facility]            Relevant Problems   Anesthesia (within normal limits)      Cardiac (within normal limits)      Pulmonary (within normal limits)      Neuro   (+) Anxiety   (+) MDD (major depressive disorder)   (+) PTSD (post-traumatic stress disorder)   (+) Peripheral neuropathy      GI   (+) Irritable bowel syndrome      /Renal (within normal limits)      Liver (within normal limits)      Endocrine   (+) Hypothyroidism      Hematology (within normal limits)      Musculoskeletal   (+) DDD (degenerative disc disease), lumbar      HEENT (within normal limits)      ID   (+) Traveler's diarrhea   (+) Varicella without complication      Skin (within normal limits)      GYN   (+) Abnormal uterine bleeding       Clinical information reviewed:   Tobacco  Allergies  Meds  Problems  Med Hx  Surg Hx  OB Status    Fam Hx  Soc Hx        NPO Detail:  NPO/Void Status  Carbohydrate Drink Given Prior to Surgery? : Y  Date of Last Liquid: 07/17/25  Time of Last Liquid: 0500  Date of Last Solid: 07/15/25  Time of Last Solid: 1800  Last Intake Type: Clear fluids; GI prep  Time of Last Void: 1027         Physical Exam    Airway  Mallampati: II  TM distance: >3 FB  Neck ROM: full     Cardiovascular   Rhythm: regular  Rate: normal     Dental    Pulmonary Breath sounds clear to auscultation     Abdominal Abdomen: soft  Bowel sounds: normal           Anesthesia Plan    History of general anesthesia?: yes  History of complications of general anesthesia?: no    ASA 2     MAC     The patient is not a current smoker.  Patient was not previously instructed to abstain from smoking on day of procedure.  Education provided regarding risk of obstructive sleep apnea.  intravenous induction   Anesthetic plan and risks discussed with patient.    Plan discussed with  CRNA.

## 2025-07-17 NOTE — DISCHARGE INSTRUCTIONS
Patient Instructions Post Endoscopy Procedure      The anesthetics, sedatives or narcotics which were given to you today will be acting in your body for the next 24 hours, so you might feel a little sleepy or groggy.  This feeling should slowly wear off. Carefully read and follow the instructions.     You received sedation today:  - Do not drive or operate any machinery or power tools of any kind.   - No alcoholic beverages today, not even beer or wine.  - Do not make any important decisions or sign any legal documents.  - No over the counter medications that contain alcohol or that may cause drowsiness.    While it is common to experience mild to moderate abdominal distention, gas, or belching after your procedure, if any of these symptoms occur following discharge from the GI Lab or within one week of having your procedure, call the [facility] to be advised whether a visit to your nearest Urgent Care or Emergency Department is indicated.  Take this paper with you if you go.   - If you develop an allergic reaction to the medications that were given during your procedure such as difficulty breathing, rash, hives, severe nausea, vomiting or lightheadedness.  - If you experience chest pain, shortness of breath, severe abdominal pain, fevers and chills.  -If you develop signs and symptoms of bleeding such as blood in your spit, if your stools turn black, tarry, or bloody  - If you have not urinated within 8 hours following your procedure.  - If your IV site becomes painful, red, inflamed, or looks infected.    If you received a biopsy/polypectomy the following instructions apply below:  _x_ Do not use non-steroidal medications or anti-coagulants for one week following your procedure. (Examples of these types of medications are: Advil, Arthrotec, Aleve, Coumadin, Ecotrin, Heparin, Ibuprofen, Indocin, Motrin, Naprosyn, Nuprin, Plavix, Vioxx, and Voltarin, or their generic forms.  This list is not  all-inclusive.  Check with your physician or pharmacist before resuming medications.)   _x_ Eat a soft diet today.  Avoid foods that are poorly digested for the next 24 hours.  These foods would include: nuts, beans, lettuce, red meats, and fried foods. Start with liquids and advance your diet as tolerated, gradually work up to eating solids.   _x_ You can restart your ASA tomorrow  __ You can resume your anticoagulation therapy -   Your physician recommends the additional following instructions:    -You have a contact number available for emergencies. The signs and symptoms of potential delayed complications were discussed with you. You may return to normal activities tomorrow.  -Resume your previous diet or other if specified.  -Continue your present medications.   -We are waiting for your pathology results, if applicable. The results will be available in [facility]. I will send you a message with any recommendations.  -The findings and recommendations have been discussed with you and/or family.  -Please see Medication Reconciliation Form for new medication/medications prescribed.     If you experience any problems or have any questions following discharge from the GI Lab, please call: [phone] from 7 am- 4:30 pm.  In the event of an emergency please go to the closest Emergency Department or call Dr. Andrew at [phone]

## 2025-07-23 LAB
LABORATORY COMMENT REPORT: NORMAL
PATH REPORT.FINAL DX SPEC: NORMAL
PATH REPORT.GROSS SPEC: NORMAL
PATH REPORT.TOTAL CANCER: NORMAL

## 2026-04-03 ENCOUNTER — APPOINTMENT (OUTPATIENT)
Facility: CLINIC | Age: 51
End: 2026-04-03
Payer: COMMERCIAL